# Patient Record
Sex: FEMALE | Race: WHITE | NOT HISPANIC OR LATINO | Employment: FULL TIME | URBAN - METROPOLITAN AREA
[De-identification: names, ages, dates, MRNs, and addresses within clinical notes are randomized per-mention and may not be internally consistent; named-entity substitution may affect disease eponyms.]

---

## 2023-08-09 LAB
ANION GAP IN SER/PLAS: 12 MMOL/L (ref 10–20)
CALCIUM (MG/DL) IN SER/PLAS: 9.1 MG/DL (ref 8.6–10.3)
CARBON DIOXIDE, TOTAL (MMOL/L) IN SER/PLAS: 28 MMOL/L (ref 21–32)
CHLORIDE (MMOL/L) IN SER/PLAS: 105 MMOL/L (ref 98–107)
CHOLESTEROL (MG/DL) IN SER/PLAS: 171 MG/DL (ref 0–199)
CHOLESTEROL IN HDL (MG/DL) IN SER/PLAS: 30.9 MG/DL
CHOLESTEROL/HDL RATIO: 5.5
CREATININE (MG/DL) IN SER/PLAS: 0.7 MG/DL (ref 0.5–1.05)
ESTIMATED AVERAGE GLUCOSE FOR HBA1C: 134 MG/DL
FASTING GLUCOSE (MG/DL) IN SER/PLAS: 119 MG/DL (ref 74–99)
GFR FEMALE: >90 ML/MIN/1.73M2
GLUCOSE (MG/DL) IN SER/PLAS: 120 MG/DL (ref 74–99)
HEMOGLOBIN A1C/HEMOGLOBIN TOTAL IN BLOOD: 6.3 %
LDL: 88 MG/DL (ref 0–99)
NON HDL CHOLESTEROL: 140 MG/DL
POTASSIUM (MMOL/L) IN SER/PLAS: 4 MMOL/L (ref 3.5–5.3)
SODIUM (MMOL/L) IN SER/PLAS: 141 MMOL/L (ref 136–145)
THYROTROPIN (MIU/L) IN SER/PLAS BY DETECTION LIMIT <= 0.05 MIU/L: 0.92 MIU/L (ref 0.44–3.98)
TRIGLYCERIDE (MG/DL) IN SER/PLAS: 261 MG/DL (ref 0–149)
UREA NITROGEN (MG/DL) IN SER/PLAS: 14 MG/DL (ref 6–23)
VLDL: 52 MG/DL (ref 0–40)

## 2023-11-09 ENCOUNTER — OFFICE VISIT (OUTPATIENT)
Dept: ORTHOPEDIC SURGERY | Facility: CLINIC | Age: 53
End: 2023-11-09
Payer: COMMERCIAL

## 2023-11-09 VITALS — HEIGHT: 63 IN | BODY MASS INDEX: 33.66 KG/M2 | WEIGHT: 190 LBS

## 2023-11-09 DIAGNOSIS — M54.31 SCIATIC PAIN, RIGHT: Primary | ICD-10-CM

## 2023-11-09 PROCEDURE — 99202 OFFICE O/P NEW SF 15 MIN: CPT

## 2023-11-09 PROCEDURE — 1036F TOBACCO NON-USER: CPT

## 2023-11-09 PROCEDURE — 3008F BODY MASS INDEX DOCD: CPT

## 2023-11-09 RX ORDER — ATORVASTATIN CALCIUM 10 MG/1
TABLET, FILM COATED ORAL
COMMUNITY

## 2023-11-09 RX ORDER — METHYLPREDNISOLONE 4 MG/1
4 TABLET ORAL ONCE
Qty: 21 TABLET | Refills: 0 | Status: SHIPPED | OUTPATIENT
Start: 2023-11-09 | End: 2023-11-09

## 2023-11-09 RX ORDER — ENALAPRIL MALEATE 10 MG/1
TABLET ORAL
COMMUNITY
Start: 2019-06-05

## 2023-11-10 NOTE — PROGRESS NOTES
Subjective    Patient ID: Camille Mchugh is a 53 y.o. female.    Chief Complaint: Other (SCIATICA PAIN)     HPI  This is a pleasant 53-year-old female presenting to the walk-in injury clinic for evaluation of right-sided sciatic pain, which has been ongoing for 4 days.  She has had right-sided sciatica in the past.  States she points to right sided lower back and buttock when describing the pain, which radiates down right thigh to the knee.  She is having difficulty sitting and standing due to the pain.  It is difficult for her to sleep at night.  Any movement of the lumbar spine aggravates pain.  Pain is described as a constant dull ache, sharp shooting at times.  She has been taking Tylenol with no relief of symptoms.  She cannot take anti-inflammatories as they cause GI upset.  She is a .    The patient's past medical, surgical, family, and social history as well as allergies and medications were reviewed and updated in the chart.    Objective   Ortho Exam  Pleasant and no acute distress. Walks with a mildly antalgic gait. There is decreased flexibility of the lumbosacral spine in terms of extension, flexion, left and right lateral flexion, left and right rotation.  There is tenderness on the lumbar paraspinal muscles, specific to right side.  Bilateral lower extremity quadriceps, hamstring, gastrocsoleus, tibialis anterior strength are intact. Sensation to light touch is intact.  Both lower extremities are well perfused the skin is intact and muscle tone is adequate.    Assessment/Plan   Encounter Diagnoses:  Sciatic pain, right    Plan: Discussion with patient about right sided sciatica pain.  I have provided her with a Medrol Dosepak to help decrease pain and inflammation.  She will also benefit from a formal physical therapy program to help with lower back strengthening and range of motion, referral provided.  As this seems to be an ongoing intermittent problem, I have also provided her a  referral to pain management, as she may benefit from steroid injections possibly.  She can continue to take Tylenol, use topical creams and ice application.  She can follow-up as needed.    Orders Placed This Encounter    Referral to Physical Therapy    Referral to Pain Medicine    methylPREDNISolone (Medrol Dospak) 4 mg tablets

## 2024-07-02 ENCOUNTER — APPOINTMENT (OUTPATIENT)
Dept: OTOLARYNGOLOGY | Facility: CLINIC | Age: 54
End: 2024-07-02
Payer: COMMERCIAL

## 2024-09-07 ENCOUNTER — LAB (OUTPATIENT)
Dept: LAB | Facility: LAB | Age: 54
End: 2024-09-07
Payer: COMMERCIAL

## 2024-09-07 DIAGNOSIS — Z13.1 ENCOUNTER FOR SCREENING FOR DIABETES MELLITUS: Primary | ICD-10-CM

## 2024-09-07 DIAGNOSIS — I10 ESSENTIAL (PRIMARY) HYPERTENSION: ICD-10-CM

## 2024-09-07 DIAGNOSIS — Z13.220 ENCOUNTER FOR SCREENING FOR LIPOID DISORDERS: ICD-10-CM

## 2024-09-07 DIAGNOSIS — E78.2 MIXED HYPERLIPIDEMIA: ICD-10-CM

## 2024-09-07 DIAGNOSIS — E04.9 NONTOXIC GOITER, UNSPECIFIED: ICD-10-CM

## 2024-09-07 LAB
ANION GAP SERPL CALC-SCNC: 13 MMOL/L (ref 10–20)
BUN SERPL-MCNC: 14 MG/DL (ref 6–23)
CALCIUM SERPL-MCNC: 9.5 MG/DL (ref 8.6–10.3)
CHLORIDE SERPL-SCNC: 104 MMOL/L (ref 98–107)
CHOLEST SERPL-MCNC: 165 MG/DL (ref 0–199)
CHOLESTEROL/HDL RATIO: 4.6
CO2 SERPL-SCNC: 30 MMOL/L (ref 21–32)
CREAT SERPL-MCNC: 0.67 MG/DL (ref 0.5–1.05)
EGFRCR SERPLBLD CKD-EPI 2021: >90 ML/MIN/1.73M*2
GLUCOSE SERPL-MCNC: 113 MG/DL (ref 74–99)
HDLC SERPL-MCNC: 36.1 MG/DL
LDLC SERPL CALC-MCNC: 84 MG/DL
NON HDL CHOLESTEROL: 129 MG/DL (ref 0–149)
POTASSIUM SERPL-SCNC: 4.5 MMOL/L (ref 3.5–5.3)
SODIUM SERPL-SCNC: 142 MMOL/L (ref 136–145)
TRIGL SERPL-MCNC: 225 MG/DL (ref 0–149)
TSH SERPL-ACNC: 0.6 MIU/L (ref 0.44–3.98)
VLDL: 45 MG/DL (ref 0–40)

## 2024-09-07 PROCEDURE — 36415 COLL VENOUS BLD VENIPUNCTURE: CPT

## 2024-10-23 ENCOUNTER — HOSPITAL ENCOUNTER (OUTPATIENT)
Dept: RADIOLOGY | Facility: HOSPITAL | Age: 54
Discharge: HOME | End: 2024-10-23

## 2024-10-23 DIAGNOSIS — Z13.6 ENCOUNTER FOR SCREENING FOR CARDIOVASCULAR DISORDERS: ICD-10-CM

## 2024-10-23 PROCEDURE — 75571 CT HRT W/O DYE W/CA TEST: CPT | Mod: LIO

## 2024-11-20 ENCOUNTER — OFFICE VISIT (OUTPATIENT)
Dept: OTOLARYNGOLOGY | Facility: CLINIC | Age: 54
End: 2024-11-20
Payer: COMMERCIAL

## 2024-11-20 VITALS
TEMPERATURE: 98.2 F | SYSTOLIC BLOOD PRESSURE: 128 MMHG | RESPIRATION RATE: 16 BRPM | DIASTOLIC BLOOD PRESSURE: 68 MMHG | BODY MASS INDEX: 34.11 KG/M2 | HEART RATE: 73 BPM | HEIGHT: 62 IN | OXYGEN SATURATION: 94 % | WEIGHT: 185.38 LBS

## 2024-11-20 DIAGNOSIS — J32.9 CHRONIC SINUSITIS, UNSPECIFIED LOCATION: ICD-10-CM

## 2024-11-20 DIAGNOSIS — R09.81 NASAL CONGESTION: ICD-10-CM

## 2024-11-20 DIAGNOSIS — J34.2 NASAL SEPTAL DEVIATION: ICD-10-CM

## 2024-11-20 DIAGNOSIS — J31.0 CHRONIC RHINITIS: Primary | ICD-10-CM

## 2024-11-20 DIAGNOSIS — L29.9 EAR ITCHING: ICD-10-CM

## 2024-11-20 DIAGNOSIS — H61.22 IMPACTED CERUMEN OF LEFT EAR: ICD-10-CM

## 2024-11-20 DIAGNOSIS — J34.3 HYPERTROPHY OF BOTH INFERIOR NASAL TURBINATES: ICD-10-CM

## 2024-11-20 PROCEDURE — 99213 OFFICE O/P EST LOW 20 MIN: CPT | Performed by: STUDENT IN AN ORGANIZED HEALTH CARE EDUCATION/TRAINING PROGRAM

## 2024-11-20 PROCEDURE — 1036F TOBACCO NON-USER: CPT | Performed by: STUDENT IN AN ORGANIZED HEALTH CARE EDUCATION/TRAINING PROGRAM

## 2024-11-20 PROCEDURE — 3008F BODY MASS INDEX DOCD: CPT | Performed by: STUDENT IN AN ORGANIZED HEALTH CARE EDUCATION/TRAINING PROGRAM

## 2024-11-20 PROCEDURE — 99203 OFFICE O/P NEW LOW 30 MIN: CPT | Performed by: STUDENT IN AN ORGANIZED HEALTH CARE EDUCATION/TRAINING PROGRAM

## 2024-11-20 RX ORDER — FLUTICASONE PROPIONATE 50 MCG
2 SPRAY, SUSPENSION (ML) NASAL DAILY
Qty: 16 G | Refills: 11 | Status: SHIPPED | OUTPATIENT
Start: 2024-11-20 | End: 2025-11-20

## 2024-11-20 SDOH — ECONOMIC STABILITY: FOOD INSECURITY: WITHIN THE PAST 12 MONTHS, YOU WORRIED THAT YOUR FOOD WOULD RUN OUT BEFORE YOU GOT MONEY TO BUY MORE.: NEVER TRUE

## 2024-11-20 SDOH — ECONOMIC STABILITY: FOOD INSECURITY: WITHIN THE PAST 12 MONTHS, THE FOOD YOU BOUGHT JUST DIDN'T LAST AND YOU DIDN'T HAVE MONEY TO GET MORE.: NEVER TRUE

## 2024-11-20 ASSESSMENT — LIFESTYLE VARIABLES
SKIP TO QUESTIONS 9-10: 1
HOW OFTEN DO YOU HAVE A DRINK CONTAINING ALCOHOL: NEVER
HOW OFTEN DO YOU HAVE SIX OR MORE DRINKS ON ONE OCCASION: NEVER
AUDIT-C TOTAL SCORE: 0
HOW MANY STANDARD DRINKS CONTAINING ALCOHOL DO YOU HAVE ON A TYPICAL DAY: PATIENT DOES NOT DRINK

## 2024-11-20 ASSESSMENT — ENCOUNTER SYMPTOMS
LOSS OF SENSATION IN FEET: 0
OCCASIONAL FEELINGS OF UNSTEADINESS: 0
DEPRESSION: 0

## 2024-11-20 ASSESSMENT — COLUMBIA-SUICIDE SEVERITY RATING SCALE - C-SSRS
1. IN THE PAST MONTH, HAVE YOU WISHED YOU WERE DEAD OR WISHED YOU COULD GO TO SLEEP AND NOT WAKE UP?: NO
6. HAVE YOU EVER DONE ANYTHING, STARTED TO DO ANYTHING, OR PREPARED TO DO ANYTHING TO END YOUR LIFE?: NO
2. HAVE YOU ACTUALLY HAD ANY THOUGHTS OF KILLING YOURSELF?: NO

## 2024-11-20 ASSESSMENT — PATIENT HEALTH QUESTIONNAIRE - PHQ9
SUM OF ALL RESPONSES TO PHQ9 QUESTIONS 1 AND 2: 0
2. FEELING DOWN, DEPRESSED OR HOPELESS: NOT AT ALL
1. LITTLE INTEREST OR PLEASURE IN DOING THINGS: NOT AT ALL

## 2024-11-20 ASSESSMENT — PAIN SCALES - GENERAL: PAINLEVEL_OUTOF10: 3

## 2024-11-20 NOTE — PATIENT INSTRUCTIONS
For ear itching you can try using 1-2 drops of baby oil or sweet oil in each ear 1-2 times a week.  Put the oil in your ear and lie on your side for 5 to 10 minutes, then switch sides. Alternatively you can use a skin moisturizer or over-the-counter steroid cream in the ear canal.    Avoid using Q-tips in your ear because these can actually dry your ear out further and cause more itching.    -------------------------------------------    Please use the combination of nasal steroid spray and saline irrigations for the next several months. Then see me again in 2-3 months to check in.    NASAL STEROID SPRAY INSTRUCTIONS    Please take the prescribed nasal spray as directed. BE SURE TO POINT THE SPRAY TOWARDS THE CORNER OF THE EYE ON THE SAME SIDE NOSTRIL. This will ensure you are treating the appropriate parts of your nose that are swollen or inflamed.    This medication will take upwards of one month before you notice full benefit. You MUST use it every day for it to be effective.

## 2024-11-20 NOTE — PROGRESS NOTES
SUBJECTIVE  Patient ID: Camille Mchugh is a 54 y.o. female who presents for New Patient Visit (Ear and sinus issues).    She reports that starting a year ago she noted new onset fullness on the left maxillary sinus. She notes itching and wetness of the ear. She reports 4 infections treated with antibiotics. They endorse nasal congestion and nasal obstruction. They deny epistaxis. Sense of smell seems down.  They endorse a history of environmental allergies; no formal allergy testing. No asthma but does have eczema. They deny a history of nasal/sinus surgery/trauma. Previously tried steroid spray (Flonase intermittently), antihistamine spray (slightly helpful), and decongestant spray (using Afrin intermittently).     Reportedly had audiogram last year which was normal.  She reports that she was seen by an outside ENT nurse practitioner who performed nasal endoscopy and did not note anything of significance.    Review of Systems  Complete ROS negative except as noted above or on patient intake form and as above.    OBJECTIVE  Physical Exam  CONSTITUTIONAL: Well appearing female who appears stated age.  PSYCHIATRIC: Alert, appropriate mood and affect.  RESPIRATORY: Normal inspiration and expiration and chest wall expansion; no use of accessory muscles to breathe.  VOICE: Clear speech without hoarseness. No stridor nor stertor.  HEAD, FACE, AND SKIN: Symmetric facial feature. No cutaneous masses or lesions were visualized. The parotid and submandibular glands were normal to palpation.  EYES: Pupils were equal in size and reactive to light. Extra-ocular muscle function was intact. No nystagmus was observed. Vision was grossly intact.  EARS: External ears were normally formed with no lesions. The external auditory canals were clear. Left cleaned of non-obstructive cerumen. The tympanic membranes were intact and in the neutral position. No significant retraction pockets nor effusions were appreciated.  NOSE: Nasal dorsum was  midline. Anterior rhinoscopy demonstrated a septum deviated to the left. Inferior turbinates were moderately hypertrophied. No obvious nasal masses, polyps, mucopurulence, nor other lesions were appreciated.  ORAL CAVITY: Lips were without lesions. Moist mucous membranes. No lesions appreciated along the gingiva, oral mucosa, nor tongue.  DENTITION: Grossly normal without obvious infection nor inflammation.  OROPHARYNX: No lesion nor mucosal abnormality. The uvula was normal appearing. The tonsils were 1+. Prominent gag.  NECK: Visualization and palpation of the neck revealed no mass lesions, no thyromegaly or thyroid masses. No cutaneous lesions appreciated.  LYMPHATICS (CERVICAL): There were no palpable lymph nodes in the posterior triangle, submandibular triangle, jugulodigastric region, nor central neck.  NEUROLOGIC: Cranial nerves III, IV, and VI were noted to be intact via extra-ocular muscle movement testing. Cranial nerve V was noted to be intact to soft touch bilaterally. Cranial nerve VII was noted to be intact and symmetric by facial movement. Cranial nerve VIII was tested with normal voice examination and revealed grossly normal hearing. Cranial nerves IX and X noted to be intact by palatal movement. Cranial nerve XI noted to be intact via shoulder shrug.  Cranial nerve XII noted to be intact with active and symmetric tongue movement.     ASSESSMENT/PLAN  Diagnoses and all orders for this visit:  Chronic rhinitis  -     fluticasone (Flonase) 50 mcg/actuation nasal spray; Administer 2 sprays into each nostril once daily. Shake gently. Before first use, prime pump. After use, clean tip and replace cap.  -     CT sinuss wo IV contrast volumetric surgical planning; Future  Chronic sinusitis, unspecified location  -     CT sinuss wo IV contrast volumetric surgical planning; Future  Ear itching  Nasal septal deviation  Hypertrophy of both inferior nasal turbinates  Nasal congestion  Impacted cerumen of left  ear      54 y.o. female presented today with several concerns.    1.  Chronic rhinitis, concern for chronic sinusitis, recurrent acute sinusitis, left facial pressure/congestion, NSD/ITH  The patient is noting roughly a year of recurrent acute sinusitis and persistent left-sided nasal congestion and facial pressure.  On exam they have left nasal septal deviation without obvious mucopurulence draining from the middle meatus.    Endoscopy: Deferred by patient; reportedly had normal endoscopy over the summer at outside facility  Imaging: Ordered    I discussed my findings with the patient and offered reassurance and counseling.  We discussed her options moving forward including continued observation, changes to medical therapy, repeat nasal endoscopy, and/or imaging.  The patient is interested in dedicated sinus imaging.  I encouraged her to consider trialing fluticasone nasal spray on a more regular basis while awaiting repeat evaluation.  We discussed that she can continue her azelastine spray with fluticasone nasal spray.    Follow-up in 2 to 3 months to check on her progress.  I will call her with the results of CT imaging if acute findings are appreciated.    2.  Ear itching, cerumen impaction, history of eczema  The patient also notes a history of recurrent ear itching in the setting of eczema.  On exam she had not impacted cerumen cleaned from the left ear canal.  The right ear canal is relatively clear.  I appreciate no evidence of active infection.  Reassurance was provided.  I recommended conservative therapy for chronic eczematoid otitis externa as needed.  We discussed use of skin moisturizing creams, oils, and/or topical steroid creams as needed.    This note was created using speech recognition transcription software. Despite proofreading, typographical errors may be present that affect the meaning of the content. Please contact my office with any questions.

## 2024-11-27 ENCOUNTER — APPOINTMENT (OUTPATIENT)
Dept: OTOLARYNGOLOGY | Facility: CLINIC | Age: 54
End: 2024-11-27
Payer: COMMERCIAL

## 2024-12-03 ENCOUNTER — HOSPITAL ENCOUNTER (OUTPATIENT)
Dept: RADIOLOGY | Facility: HOSPITAL | Age: 54
Discharge: HOME | End: 2024-12-03
Payer: COMMERCIAL

## 2024-12-03 DIAGNOSIS — J31.0 CHRONIC RHINITIS: ICD-10-CM

## 2024-12-03 DIAGNOSIS — J32.9 CHRONIC SINUSITIS, UNSPECIFIED LOCATION: ICD-10-CM

## 2024-12-03 PROCEDURE — 70486 CT MAXILLOFACIAL W/O DYE: CPT

## 2025-02-05 ENCOUNTER — APPOINTMENT (OUTPATIENT)
Dept: OTOLARYNGOLOGY | Facility: CLINIC | Age: 55
End: 2025-02-05
Payer: COMMERCIAL

## 2025-03-18 ENCOUNTER — APPOINTMENT (OUTPATIENT)
Dept: PRIMARY CARE | Facility: CLINIC | Age: 55
End: 2025-03-18
Payer: COMMERCIAL

## 2025-03-24 PROBLEM — I10 ESSENTIAL (PRIMARY) HYPERTENSION: Status: ACTIVE | Noted: 2023-02-09

## 2025-03-25 ENCOUNTER — APPOINTMENT (OUTPATIENT)
Dept: PRIMARY CARE | Facility: CLINIC | Age: 55
End: 2025-03-25
Payer: COMMERCIAL

## 2025-03-25 VITALS
WEIGHT: 191 LBS | DIASTOLIC BLOOD PRESSURE: 78 MMHG | OXYGEN SATURATION: 94 % | HEART RATE: 71 BPM | HEIGHT: 62 IN | BODY MASS INDEX: 35.15 KG/M2 | SYSTOLIC BLOOD PRESSURE: 130 MMHG | TEMPERATURE: 97.3 F

## 2025-03-25 DIAGNOSIS — E04.9 GOITER: ICD-10-CM

## 2025-03-25 DIAGNOSIS — R73.03 PREDIABETES: Primary | ICD-10-CM

## 2025-03-25 DIAGNOSIS — I10 ESSENTIAL (PRIMARY) HYPERTENSION: ICD-10-CM

## 2025-03-25 DIAGNOSIS — E78.2 MIXED HYPERLIPIDEMIA: ICD-10-CM

## 2025-03-25 LAB — POC HEMOGLOBIN A1C: 6.4 % (ref 4.2–6.5)

## 2025-03-25 PROCEDURE — 83036 HEMOGLOBIN GLYCOSYLATED A1C: CPT | Performed by: INTERNAL MEDICINE

## 2025-03-25 PROCEDURE — 1036F TOBACCO NON-USER: CPT | Performed by: INTERNAL MEDICINE

## 2025-03-25 PROCEDURE — 99203 OFFICE O/P NEW LOW 30 MIN: CPT | Performed by: INTERNAL MEDICINE

## 2025-03-25 PROCEDURE — 3008F BODY MASS INDEX DOCD: CPT | Performed by: INTERNAL MEDICINE

## 2025-03-25 PROCEDURE — 3075F SYST BP GE 130 - 139MM HG: CPT | Performed by: INTERNAL MEDICINE

## 2025-03-25 PROCEDURE — 3078F DIAST BP <80 MM HG: CPT | Performed by: INTERNAL MEDICINE

## 2025-03-25 RX ORDER — ATORVASTATIN CALCIUM 20 MG/1
20 TABLET, FILM COATED ORAL DAILY
COMMUNITY

## 2025-03-25 RX ORDER — POLYETHYLENE GLYCOL 3350 17 G/17G
17 POWDER, FOR SOLUTION ORAL AS NEEDED
COMMUNITY

## 2025-03-25 RX ORDER — AZELASTINE 1 MG/ML
1 SPRAY, METERED NASAL 2 TIMES DAILY
COMMUNITY

## 2025-03-25 ASSESSMENT — PATIENT HEALTH QUESTIONNAIRE - PHQ9
2. FEELING DOWN, DEPRESSED OR HOPELESS: NOT AT ALL
1. LITTLE INTEREST OR PLEASURE IN DOING THINGS: NOT AT ALL
SUM OF ALL RESPONSES TO PHQ9 QUESTIONS 1 AND 2: 0

## 2025-03-25 ASSESSMENT — ENCOUNTER SYMPTOMS
ABDOMINAL PAIN: 0
FREQUENCY: 0
NERVOUS/ANXIOUS: 0
JOINT SWELLING: 0
EYE PAIN: 0
UNEXPECTED WEIGHT CHANGE: 0
WHEEZING: 0
CHILLS: 0
EYE DISCHARGE: 0
SEIZURES: 0
WEAKNESS: 0
APPETITE CHANGE: 0
WOUND: 0
BACK PAIN: 0
CONSTIPATION: 1
FLANK PAIN: 0
DIZZINESS: 0
NAUSEA: 0
TREMORS: 0
NUMBNESS: 0
VOMITING: 0
TROUBLE SWALLOWING: 0
DYSURIA: 0
BLOOD IN STOOL: 0
DIARRHEA: 0
HEADACHES: 0
SHORTNESS OF BREATH: 0
CONFUSION: 0
HEMATURIA: 0
COUGH: 0
SORE THROAT: 0
PALPITATIONS: 0
SLEEP DISTURBANCE: 0
FEVER: 0

## 2025-03-25 ASSESSMENT — PAIN SCALES - GENERAL: PAINLEVEL_OUTOF10: 0-NO PAIN

## 2025-03-25 NOTE — PROGRESS NOTES
"Subjective   Patient ID: Camille Mchugh is a 54 y.o. female who presents for New Pt est care.    HPI   NEW PT (PCP Johnny Copeland)  Here to establish .  Chart reviewed, PMHX, meds, Social HX- updated at visit   Labs( sept 2024: Tg 225, Ok bmp and TSH)  and imaging reviewed.  A1c 6.3 in 2023 with no f/u since !    Thyroid US 2019:   IMPRESSION:  Benign subcentimeter colloid cysts in a nonenlarged thyroid gland.    Recent recent ER visits or hospitalizations:  No    Specialists:  Ob-gyn - Yes  [facility]- recurrent sinus infection  Podiatry     Review of Systems   Constitutional:  Negative for appetite change, chills, fever and unexpected weight change.   HENT:  Negative for congestion, ear pain, sneezing, sore throat and trouble swallowing.    Eyes:  Negative for pain, discharge and visual disturbance.   Respiratory:  Negative for cough, shortness of breath and wheezing.    Cardiovascular:  Negative for chest pain, palpitations and leg swelling.   Gastrointestinal:  Positive for constipation. Negative for abdominal pain, blood in stool, diarrhea, nausea and vomiting.   Genitourinary:  Negative for dysuria, flank pain, frequency, hematuria and urgency.   Musculoskeletal:  Negative for back pain, gait problem and joint swelling.   Skin:  Negative for rash and wound.   Neurological:  Negative for dizziness, tremors, seizures, syncope, weakness, numbness and headaches.   Psychiatric/Behavioral:  Negative for confusion, sleep disturbance and suicidal ideas. The patient is not nervous/anxious.        Objective   /78   Pulse 71   Temp 36.3 °C (97.3 °F)   Ht 1.575 m (5' 2\")   Wt 86.6 kg (191 lb)   SpO2 94%   BMI 34.93 kg/m²   Patient Health Questionnaire-2 Score: 0      Physical Exam  Vitals and nursing note reviewed.   Constitutional:       General: She is not in acute distress.     Appearance: Normal appearance.   HENT:      Head: Normocephalic and atraumatic.      Nose: Nose normal.      Mouth/Throat:      " "Mouth: Mucous membranes are moist.      Pharynx: Oropharynx is clear.   Eyes:      Extraocular Movements: Extraocular movements intact.      Conjunctiva/sclera: Conjunctivae normal.      Pupils: Pupils are equal, round, and reactive to light.   Neck:      Thyroid: Thyromegaly (chronic cysts) present.   Cardiovascular:      Rate and Rhythm: Normal rate and regular rhythm.      Heart sounds: No murmur heard.  Pulmonary:      Effort: Pulmonary effort is normal. No respiratory distress.      Breath sounds: Normal breath sounds. No wheezing or rhonchi.   Abdominal:      General: Abdomen is protuberant. Bowel sounds are normal.      Palpations: Abdomen is soft.      Tenderness: There is no abdominal tenderness.   Musculoskeletal:         General: Normal range of motion.      Cervical back: Neck supple.      Right lower leg: No edema.      Left lower leg: No edema.   Skin:     General: Skin is warm and dry.      Findings: No bruising or rash.   Neurological:      General: No focal deficit present.      Mental Status: She is alert and oriented to person, place, and time.      Motor: No weakness.      Gait: Gait normal.   Psychiatric:         Mood and Affect: Mood normal.         Behavior: Behavior normal.       Assessment/Plan   Assessment & Plan  Prediabetes  - chronic,  A1c has not been checked since 2023   POC : A1c 6.4   - pt interested in weight loss medicines - and agrees with clinical pharmacy referral   Orders:    POCT glycosylated hemoglobin (Hb A1C) manually resulted    Referral to Clinical Pharmacy; Future    Essential (primary) hypertension  - chronic, BP controlled on ACEI  - denies h/o MI or stroke       Mixed hyperlipidemia  - chronic, TG >200  - on mod-high dose atorvastatin        Goiter  - pt had remote formal eval and has \"colloid cysts in a nonenlarged thyroid gland\"-results reviewed in epic         RTC 6 months well check/physical /labs      "

## 2025-03-28 ENCOUNTER — TELEMEDICINE (OUTPATIENT)
Dept: PHARMACY | Facility: HOSPITAL | Age: 55
End: 2025-03-28
Payer: COMMERCIAL

## 2025-03-28 DIAGNOSIS — E66.9 OBESITY (BMI 30-39.9): Chronic | ICD-10-CM

## 2025-03-28 DIAGNOSIS — R73.03 PREDIABETES: Primary | Chronic | ICD-10-CM

## 2025-03-28 DIAGNOSIS — I10 ESSENTIAL (PRIMARY) HYPERTENSION: Chronic | ICD-10-CM

## 2025-03-28 RX ORDER — PSEUDOEPHEDRINE HCL 30 MG
30 TABLET ORAL EVERY 6 HOURS PRN
COMMUNITY

## 2025-03-28 RX ORDER — CETIRIZINE HYDROCHLORIDE 5 MG/1
10 TABLET ORAL DAILY PRN
COMMUNITY

## 2025-03-28 NOTE — PATIENT INSTRUCTIONS
NAME: Camille Mchugh   DATE: 3/28/2025     Your Pharmacist Today: Malou Elaine, Frank  Your Primary Care Physician: Keyanna Levy MD   Your Referring Provider: Keyanna Levy MD    Thank you for your time today, Ms. Camille Mchugh. It was a pleasure managing your health together! Below is a summary of your treatment plan, other important information, and our pharmacy team's contact numbers:  If you need to schedule or re-schedule an appointment with us, please call our scheduling line at [phone], option 1.   To schedule non-pharmacy appointments please call [phone]  If you are out of medication refills or have a medical question, please contact me at my direct line, [phone]. Please allow for up to 48 hours for a return call. You can also contact me through [employer].    DIAGNOSIS:        TREATMENT PLAN     Medication Changes:      Patient Goals  Weight Loss  BMI <25  2.5% weight loss in one month   At least 10% weight loss in 6 months  Cholesterol  LDL-C <55  TG <150   Blood Pressure   /80     Patient Instructions/Plan  Get hba1c lab - checked all ordered labs should be $0 copay for patient  Increase dietary fiber    Follow-up Appointment: Follow-up with me in next week.  Malou Elaine, PharmD   Clinical Pharmacist Specialist, Primary Care   Phone: [phone]   Fax: [phone]   Email: [email or web address]      MOUNJARO EDUCATION     Mounjaro is the first and only approved treatment in a different class of medication for type 2 diabetes.  It works differently than other type 2 diabetes medications by directly activating GIP and GLP-1 pathways to help regulate blood sugar.  GIP=glucose-dependent insulinotropic polypeptide; GLP-1=glucagon-like peptide-1  How Mounjaro works  Mounjaro is an injectable prescription medicine that is used along with diet and exercise to improve blood sugar (glucose) in adults with type 2 diabetes mellitus.  Mounjaro helps your body both regulate blood  sugar and decrease how much food you eat  It may start working to lower blood sugar from the first dose.  The 2.5 mg starting dose is not meant for blood sugar control.  Mounjaro works in multiple ways. It helps:  The body release insulin when blood sugar is high  The body remove excess sugar from the blood  Stop the liver from making and releasing too much sugar  Reduce how much food is eaten  Slow down how quickly food leaves the stomach. This lessens over time  MOUNJARO is a single-dose prefilled pen.  MOUNJARO is used 1 time each week.  Inject under the skin (subcutaneously) only.  You or another person can inject into your stomach (abdomen) or thigh.  Change (rotate) your injection site with each weekly injection. Do not use the same site for each injection.  Another person can inject into the back of your upper arm.  You may give an injection of Mounjaro and insulin in the same body area (such as your stomach area), but not right next to each other.    If you take too much Mounjaro, call your healthcare provider or seek medical advice promptly.    Mounjaro may cause serious side effects, including:  Inflammation of the pancreas (pancreatitis). Stop using Mounjaro and call your healthcare provider right away if you have severe pain in your stomach area (abdomen) that will not go away, with or without vomiting. You may feel the pain from your abdomen to your back.  Low blood sugar (hypoglycemia). Your risk for getting low blood sugar may be higher if you use Mounjaro with another medicine that can cause low blood sugar, such as a sulfonylurea or insulin. Signs and symptoms of low blood sugar may include dizziness or light-headedness, sweating, confusion or drowsiness, headache, blurred vision, slurred speech, shakiness, fast heartbeat, anxiety, irritability, or mood changes, hunger, weakness and feeling jittery.  Serious allergic reactions. Stop using Mounjaro and get medical help right away if you have any  symptoms of a serious allergic reaction, including swelling of your face, lips, tongue or throat, problems breathing or swallowing, severe rash or itching, fainting or feeling dizzy, and very rapid heartbeat.  Kidney problems (kidney failure). In people who have kidney problems, diarrhea, nausea, and vomiting may cause a loss of fluids (dehydration), which may cause kidney problems to get worse. It is important for you to drink fluids to help reduce your chance of dehydration.  Severe stomach problems. Stomach problems, sometimes severe, have been reported in people who use Mounjaro. Tell your healthcare provider if you have stomach problems that are severe or will not go away.  Changes in vision. Tell your healthcare provider if you have changes in vision during treatment with Mounjaro.  Gallbladder problems. Gallbladder problems have happened in some people who use Mounjaro. Tell your healthcare provider right away if you get symptoms of gallbladder problems, which may include pain in your upper stomach (abdomen), fever, yellowing of skin or eyes (jaundice), and clotilde-colored stools.  Food or liquid getting into the lungs during surgery or other procedures that use anesthesia or deep sleepiness (deep sedation). Mounjaro may increase the chance of food getting into your lungs during surgery or other procedures. Tell all your healthcare providers that you are taking Mounjaro before you are scheduled to have surgery or other procedures.    Preparing to inject MOUNJARO  Remove the Pen from the refrigerator.  Leave the gray base cap on until you are ready to inject.  Check the Pen label to make sure you have the right medicine and dose, and that it has not .  Inspect the Pen to make sure that it is not damaged.  Make sure the medicine:  is not frozen  is not cloudy  is colorless to slightly yellow  does not have particles  Choose your injection site  You or another person can inject the medicine in your stomach  (abdomen) or thigh.  Make sure the Pen is locked.  Do not unlock the Pen until you place the clear base on your skin and are ready to inject.  Pull the gray base cap straight off and throw it away in your household trash.  Do not put the gray base cap back on - this could damage the needle.  Do not touch the needle  Place clear base on skin, then unlock  Place the clear base flat against your skin at the injection site.  Unlock by turning the lock ring.  Press and hold the purple injection button for up to 10 seconds.  Listen for:  First click = injection started  Second click = injection completed  You will know your injection is complete when the gray plunger is visible.    Disposing of your used Pen  Put your used Pen in an FDA-cleared sharps disposal container right away after use. Do not throw away (dispose of) Pens in your household trash.  If you do not have an FDA-cleared sharps disposal container, you may use a household container that is: made of a heavy-duty plastic, can be closed with a tight-fitting, puncture-resistant lid, without sharps being able to come out, upright and stable during use, leak-resistant, and properly labeled to warn of hazardous waste inside the container.  When your sharps disposal container is almost full, you will need to follow your community guidelines for the right way to dispose of your sharps disposal container. There may be state or local laws about how you should throw away used needles and syringes.   For more information about safe sharps disposal, and for specific information about sharps disposal in the state that you live in, go to the FDA's website at: http://www.fda.gov/safesharpsdisposal.  Do not recycle your used sharps disposal container.    Storage and handling  Store your Pen in the refrigerator between 36°F to 46°F (2°C to 8°C).  You may store your Pen at room temperature up to 86°F (30°C) for up to 21 days.  Do not freeze your Pen. If the Pen has been frozen,  throw the Pen away and use a new Pen.  Store your Pen in the original carton to protect your Pen from light.  The Pen has glass parts. Handle it carefully. If you drop the Pen on a hard surface, do not use it. Use a new Pen for your injection.  Keep your MOUNJARO Pen and all medicines out of the reach of children.    Commonly asked questions  What if I see air bubbles in my Pen?  Air bubbles are normal.  What if my Pen is not at room temperature?  It is not necessary to warm the Pen to room temperature.  What if I unlock the Pen and press the purple injection button before pulling off the gray base cap?  Do not remove the gray base cap.   Throw away the Pen and get a new Pen.  What if there is a drop of liquid on the tip of the needle when I remove the gray base cap?  A drop of liquid on the tip of the needle is normal. Do not touch the needle.  Do I need to hold the injection button down until the injection is complete?  This is not necessary, but it may help you keep the Pen steady against your skin.  I heard more than 2 clicks during my injection--2 loud clicks and 1 soft one. Did I get my complete injection?  Some people may hear a soft click right before the second loud click. That is the normal operation of the Pen. Do not remove the Pen from your skin until you hear the second loud click.  I am not sure if my Pen worked the right way.  Check to see if you have received your dose. Your dose was delivered the right way if the gray plunger is visible.   If you do not see the nuñez plunger, contact Erika at 3-136-Ajfgp-Rx ([phone]) for further instructions. Until then, store your Pen safely to avoid an accidental needle stick.       IMPORTANT INFORMATION     Please call 911 for medical emergencies.  Our pharmacy team is generally available from Monday-Friday, 8 am - 5 pm.  If you need to get in touch with me, you may either call me at my direct line or you can use [employer].  If you are unable to make your  appointment, kindly call your our pharmacy scheduling line at [phone] at least 48 hours in advance to cancel and reschedule.  There are no supporting services by the pharmacy team on weekends and holidays, or after 5 PM on weekdays.      RETAIL PHARMACY     [facility]    [address]  [place] [id number]  Phone: [phone]  Hours: M-F: 8 am - 6 pm;   Saturday: 8 am - 4 pm; Sunday 9 am - 1 pm

## 2025-03-28 NOTE — PROGRESS NOTES
Clinical Pharmacy Appointment    Patient ID: [id number] Camille Mchugh is a 54 y.o. female who presents for First appointment for weight loss/ pre-diabetes. PCP and Referring Provider: Keyanna Levy MD   Last visit with PCP: 3/25/25   Next visit with PCP: 9/25/25    Subjective     PMH:   Past Medical History:   Diagnosis Date    Personal history of other diseases of the circulatory system     History of hypertension    Sinusitis       Family Hx:  family history includes Eczema in an other family member; environmental allergies in an other family member.     Social Hx:   reports that she has never smoked. She has never used smokeless tobacco. She reports that she does not drink alcohol and does not use drugs.     HISTORY OF PRESENT ILLNESS    WEIGHT LOSS    BMI Readings from Last 3 Encounters:   03/25/25 34.93 kg/m²   11/20/24 33.91 kg/m²   04/06/24 34.76 kg/m²   CLASS II OBESITY  Starting weight: 191 lbs. (86.6 kg)  Current weight:  In-Office  3/25/25 - 191 lbs  Does have a scale at home, but does not weigh self at home    Weight Related Complications   Prediabetes, type 2 diabetes mellitus (T2D), dyslipidemia, hypertension, metabolic syndrome, cardiovascular disease, nonalcoholic fatty liver disease, osteoarthritis, depression   Obstructive sleep apnea, asthma and reactive airway disease, and gastroesophageal reflux disease    Potential Contributing Factors  Alcohol use: Average 0 drinks/week  Tobacco use: No  Other drug use: No  Medications that may cause weight gain: none  Mental health: No current concerns (seasonal depression)  Eating Disorders: Denies Hx of any eating disorder  Comorbidities: Comorbidities: depressed mood, high cholesterol, and hypertension controlled with oral meds  Constipation, excess gas    Lifestyle  Goal  No more potato chips  Decrease sweets  Eats minimal  Lost 5-6 lbs last year and plateau'ed  Has always struggled with weight loss  Diet: 3 meals/day  Does not over eat, or eat   Eats  small meals at work  BK: eggs, sausage   LN: lunch-able  DN: small plates of home cooked meals  Snacks: chips occasionally, nuts, mini candy car, apple, grapes  Eliminated most junk food  Drinks: lots of water  Eats out x1 a week  Carry out: long horn  Has worked with nutritionist/dietician? 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 No, exclusion   Eligible for copay cards/programs? Yes, if PA approved - antidiabetics only  Eligible for  PAP? Yes, reports income <400% FPL    Medication Estimated Cost Expected weight loss Patient Risks and Cautions Notes   Wegovy   (semaglutide) $499/mo with savings card. 10-20% None      Zepbound   (tirzepatide) $650/mo with savings card.  Vials available at lower costs. 10-20%     Qsymia (phentermine-topiramate) $98/month via  Qsymia Engage 8-10% None Controlled substance   Contrave (bupropion-naltrexone) $99/month   via [employer] 5-10% None    Adipex   (phentermine) ~$15/month 3-5% None Controlled substance,  Short-term use only   Ricky   (OTC Orlistat) ~$60/month 3-5%  Adverse effects likely outweigh benefit.       Medication Reconciliation:  OTCs? Added zyrtec and sudafed prn for seasonal allergies    Drug Problems  No relevant drug interactions were noted.    Medication System Management  Patient's preferred pharmacy:     [facility] [id number] [place] [facility] [place] [address] [address]  [facility] [place] [address]  Phone: [phone] Fax: [phone]     Adherence/Medication Access:   Denies missed  doses  Does not have coverage for weight loss  Mounjaro/Ozempic require PA    Allergies   Reviewed? Yes  Changes? No    Current Outpatient Medications on File Prior to Visit   Medication Sig Dispense Refill    atorvastatin (Lipitor) 20 mg tablet Take 1 tablet (20 mg) by mouth once daily.      azelastine (Astelin) 137 mcg (0.1 %) nasal spray Administer 1 spray into each nostril 2 times a day. Use in each nostril as directed      docusate sodium (COLACE ORAL) Take by mouth if needed.      enalapril (Vasotec) 10 mg tablet       polyethylene glycol (Glycolax, Miralax) 17 gram packet Take 17 g by mouth if needed.      cetirizine (ZyrTEC) 5 mg tablet Take 2 tablets (10 mg) by mouth once daily as needed for allergies.      pseudoephedrine (Sudafed) 30 mg tablet Take 1 tablet (30 mg) by mouth every 6 hours if needed for congestion.      [DISCONTINUED] atorvastatin (Lipitor) 10 mg tablet Take by mouth.      [DISCONTINUED] fluticasone (Flonase) 50 mcg/actuation nasal spray Administer 2 sprays into each nostril once daily. Shake gently. Before first use, prime pump. After use, clean tip and replace cap. (Patient not taking: Reported on 3/25/2025) 16 g 11     No current facility-administered medications on file prior to visit.       Lab Results   Component Value Date    CALCIUM 9.5 09/07/2024    CO2 30 09/07/2024     09/07/2024    CREATININE 0.67 09/07/2024    GLUCOSE 113 (H) 09/07/2024    K 4.5 09/07/2024     09/07/2024    BUN 14 09/07/2024    ANIONGAP 13 09/07/2024    EGFR >90 09/07/2024     Lab Results   Component Value Date    TRIG 225 (H) 09/07/2024    CHOL 165 09/07/2024    LDLCALC 84 09/07/2024    HDL 36.1 09/07/2024     Lab Results   Component Value Date    HGBA1C 6.4 03/25/2025       ASSESSMENT AND PLAN     Patient Goals   Stage 1 or stage 2 lose at least 5% of their body weight  AACE/ACE guidelines recommend a loss of at least 10% of body weight for many complication-specific targets  Because early weight  "loss helps predict sustained weight reduction, the AACE/ACE recommend targeting 2.5% weight loss within 1 month for all patients with overweight or obesity.   All patients with overweight or obesity achieve the realistic and meaningful goal of 5% to 10% weight loss within 6 months.    Assessment and Plan  Current regimen includes diet and mild exercise. Patient's current weight reported as 191 lbs. Has lost 0 lbs (0% of TBW) and has not started therapy plan. As insurance does not cover weight loss medications and patient is 0.1% away from a diabetes diagnoses, plan to get repeat labs.  Weight Loss Plan: Start Mounjaro 2.5 mg subcutaneously once weekly  The goals of therapy are not \"being met\" with the current medication regimen.    Medication Changes: none  CONTINUE: all current medications    Future Considerations:  Immunizations: ensure up to date   Mounjaro  Increase statin    Monitoring:  Patient states her PCP said that a repeat hba1c may be more accurate than the POCT performed at their last clinic visit     Education:   Diet   Exercise  Medication  Counseled patient on MOA, expectations, side effects, duration of therapy, contraindications, administration, and monitoring parameters  Answered all patient questions and concerns; provided [facility] phone number if issues/questions arise    Patient Instructions:  Get hba1c lab - checked all ordered labs should be $0 copay for patient  Increase dietary fiber    Clinical Pharmacist follow-up: next week, Telehealth visit    Continue all meds under the continuation of care with the referring provider and clinical pharmacy team.    Malou Elaine, Frank   Clinical Pharmacist Specialist, Primary Care   Phone: [phone]   Fax: [phone]   Email: [email or web address]    Verbal consent to manage patient's drug therapy was obtained from the patient. They were informed they may decline to participate or withdraw from participation in pharmacy services at any time  "

## 2025-04-01 LAB
25(OH)D3+25(OH)D2 SERPL-MCNC: 20 NG/ML (ref 30–100)
ALBUMIN SERPL-MCNC: 4.1 G/DL (ref 3.6–5.1)
ALP SERPL-CCNC: 98 U/L (ref 37–153)
ALT SERPL-CCNC: 25 U/L (ref 6–29)
ANION GAP SERPL CALCULATED.4IONS-SCNC: 7 MMOL/L (CALC) (ref 7–17)
AST SERPL-CCNC: 22 U/L (ref 10–35)
BASOPHILS # BLD AUTO: 41 CELLS/UL (ref 0–200)
BASOPHILS NFR BLD AUTO: 1 %
BILIRUB SERPL-MCNC: 0.4 MG/DL (ref 0.2–1.2)
BUN SERPL-MCNC: 14 MG/DL (ref 7–25)
CALCIUM SERPL-MCNC: 9.1 MG/DL (ref 8.6–10.4)
CHLORIDE SERPL-SCNC: 103 MMOL/L (ref 98–110)
CHOLEST SERPL-MCNC: 169 MG/DL
CHOLEST/HDLC SERPL: 5.1 (CALC)
CO2 SERPL-SCNC: 30 MMOL/L (ref 20–32)
CREAT SERPL-MCNC: 0.65 MG/DL (ref 0.5–1.03)
EGFRCR SERPLBLD CKD-EPI 2021: 104 ML/MIN/1.73M2
EOSINOPHIL # BLD AUTO: 119 CELLS/UL (ref 15–500)
EOSINOPHIL NFR BLD AUTO: 2.9 %
ERYTHROCYTE [DISTWIDTH] IN BLOOD BY AUTOMATED COUNT: 14 % (ref 11–15)
EST. AVERAGE GLUCOSE BLD GHB EST-MCNC: 140 MG/DL
EST. AVERAGE GLUCOSE BLD GHB EST-SCNC: 7.7 MMOL/L
GLUCOSE SERPL-MCNC: 144 MG/DL (ref 65–99)
HBA1C MFR BLD: 6.5 % OF TOTAL HGB
HCT VFR BLD AUTO: 40.6 % (ref 35–45)
HDLC SERPL-MCNC: 33 MG/DL
HGB BLD-MCNC: 13.1 G/DL (ref 11.7–15.5)
IRON SATN MFR SERPL: 18 % (CALC) (ref 16–45)
IRON SERPL-MCNC: 48 MCG/DL (ref 45–160)
LDLC SERPL CALC-MCNC: 86 MG/DL (CALC)
LYMPHOCYTES # BLD AUTO: 1456 CELLS/UL (ref 850–3900)
LYMPHOCYTES NFR BLD AUTO: 35.5 %
MAGNESIUM SERPL-MCNC: 1.9 MG/DL (ref 1.5–2.5)
MCH RBC QN AUTO: 27 PG (ref 27–33)
MCHC RBC AUTO-ENTMCNC: 32.3 G/DL (ref 32–36)
MCV RBC AUTO: 83.7 FL (ref 80–100)
MONOCYTES # BLD AUTO: 406 CELLS/UL (ref 200–950)
MONOCYTES NFR BLD AUTO: 9.9 %
NEUTROPHILS # BLD AUTO: 2079 CELLS/UL (ref 1500–7800)
NEUTROPHILS NFR BLD AUTO: 50.7 %
NONHDLC SERPL-MCNC: 136 MG/DL (CALC)
PHOSPHATE SERPL-MCNC: 4.6 MG/DL (ref 2.5–4.5)
PLATELET # BLD AUTO: 188 THOUSAND/UL (ref 140–400)
PMV BLD REES-ECKER: 9.9 FL (ref 7.5–12.5)
POTASSIUM SERPL-SCNC: 4.3 MMOL/L (ref 3.5–5.3)
PROT SERPL-MCNC: 6.7 G/DL (ref 6.1–8.1)
RBC # BLD AUTO: 4.85 MILLION/UL (ref 3.8–5.1)
SODIUM SERPL-SCNC: 140 MMOL/L (ref 135–146)
TIBC SERPL-MCNC: 272 MCG/DL (CALC) (ref 250–450)
TRIGL SERPL-MCNC: 377 MG/DL
VIT B12 SERPL-MCNC: 277 PG/ML (ref 200–1100)
WBC # BLD AUTO: 4.1 THOUSAND/UL (ref 3.8–10.8)

## 2025-04-03 ENCOUNTER — APPOINTMENT (OUTPATIENT)
Dept: PHARMACY | Facility: HOSPITAL | Age: 55
End: 2025-04-03
Payer: COMMERCIAL

## 2025-04-03 DIAGNOSIS — I10 ESSENTIAL (PRIMARY) HYPERTENSION: ICD-10-CM

## 2025-04-03 DIAGNOSIS — E78.5 HYPERLIPIDEMIA LDL GOAL <70: ICD-10-CM

## 2025-04-03 DIAGNOSIS — E11.9 TYPE 2 DIABETES MELLITUS WITHOUT COMPLICATION, WITH NO HISTORY OF INSULIN USE: Primary | ICD-10-CM

## 2025-04-03 DIAGNOSIS — E66.9 OBESITY (BMI 30-39.9): ICD-10-CM

## 2025-04-03 DIAGNOSIS — E78.1 HYPERTRIGLYCERIDEMIA: ICD-10-CM

## 2025-04-03 RX ORDER — TIRZEPATIDE 2.5 MG/.5ML
2.5 INJECTION, SOLUTION SUBCUTANEOUS WEEKLY
Qty: 2 ML | Refills: 0 | Status: SHIPPED | OUTPATIENT
Start: 2025-04-03

## 2025-04-03 RX ORDER — TIRZEPATIDE 2.5 MG/.5ML
2.5 INJECTION, SOLUTION SUBCUTANEOUS WEEKLY
Qty: 2 ML | Refills: 0 | Status: SHIPPED | OUTPATIENT
Start: 2025-04-03 | End: 2025-04-03 | Stop reason: SDUPTHER

## 2025-04-03 RX ORDER — ERGOCALCIFEROL 1.25 MG/1
50000 CAPSULE ORAL WEEKLY
Qty: 16 CAPSULE | Refills: 1 | Status: SHIPPED | OUTPATIENT
Start: 2025-04-03 | End: 2025-11-13

## 2025-04-03 RX ORDER — ATORVASTATIN CALCIUM 40 MG/1
40 TABLET, FILM COATED ORAL DAILY
Qty: 90 TABLET | Refills: 3 | Status: SHIPPED | OUTPATIENT
Start: 2025-04-03 | End: 2026-04-03

## 2025-04-03 RX ORDER — ATORVASTATIN CALCIUM 40 MG/1
40 TABLET, FILM COATED ORAL DAILY
Qty: 100 TABLET | Refills: 3 | Status: SHIPPED | OUTPATIENT
Start: 2025-04-03 | End: 2025-04-03

## 2025-04-03 NOTE — PATIENT INSTRUCTIONS
INCREASE Atorvastatin 40 mg once daily  START:   Vit D 50 k units once weekly   Mounjaro 2.5 mg under the skin once weekly, plan to increase dose according to standard titration plan pending patient tolerability and therapeutic response.   Mounjaro 5 mg under the skin once weekly x 4 weeks  Mounjaro 7.5 mg under the skin once weekly x 4 weeks  Mounjaro 10 mg under the skin once weekly x 4 weeks  Mounjaro 12.5 mg under the skin once weekly x 4 weeks  Mounjaro 15 mg under the skin once weekly as maintenance  * option to stop titration at any point and continue as maintenance dose *   Malou Elaine, PharmJESSICA   Clinical Pharmacist Specialist, Primary Care   Phone: [phone]   Fax: [phone]   Email: [email or web address]

## 2025-04-03 NOTE — PROGRESS NOTES
Clinical Pharmacy Appointment    Patient ID: [id number] Camille Mchugh is a 54 y.o. female who presents for Follow Up appointment for weight loss/ pre-diabetes. PCP and Referring Provider: Keyanna Levy MD   Last visit with PCP: 3/25/25   Next visit with PCP: 9/25/25    Subjective     PMH:   Past Medical History:   Diagnosis Date    Personal history of other diseases of the circulatory system     History of hypertension    Sinusitis       Family Hx:  family history includes Eczema in an other family member; environmental allergies in an other family member.     Social Hx:   reports that she has never smoked. She has never used smokeless tobacco. She reports that she does not drink alcohol and does not use drugs.     HISTORY OF PRESENT ILLNESS    WEIGHT LOSS    BMI Readings from Last 3 Encounters:   03/25/25 34.93 kg/m²   11/20/24 33.91 kg/m²   04/06/24 34.76 kg/m²   CLASS II OBESITY  Starting weight: 191 lbs. (86.6 kg)  Current weight:  In-Office  3/25/25 - 191 lbs  Does have a scale at home, but does not weigh self at home    Weight Related Complications   Prediabetes, type 2 diabetes mellitus (T2D), dyslipidemia, hypertension, metabolic syndrome, cardiovascular disease, nonalcoholic fatty liver disease, osteoarthritis, depression   Obstructive sleep apnea, asthma and reactive airway disease, and gastroesophageal reflux disease    Potential Contributing Factors  Alcohol use: Average 0 drinks/week  Tobacco use: No  Other drug use: No  Medications that may cause weight gain: none  Mental health: No current concerns (seasonal depression)  Eating Disorders: Denies Hx of any eating disorder  Comorbidities: Comorbidities: depressed mood, high cholesterol, and hypertension controlled with oral meds  Constipation, excess gas    Lifestyle  Goal  No more potato chips  Decrease sweets  Eats minimal  Lost 5-6 lbs last year and plateau'ed  Has always struggled with weight loss  Diet: 3 meals/day  Does not over eat, or eat large  portions  Eats small meals at work  BK: eggs, sausage   LN: lunch-able  DN: small plates of home cooked meals  Snacks: chips occasionally, nuts, mini candy car, apple, grapes  Eliminated most junk food  Drinks: lots of water  Eats out x1 a week  Carry out: long horn  Has worked with nutritionist/dietician? 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 No, exclusion   Eligible for copay cards/programs? Yes, if PA approved - antidiabetics only  Eligible for  PAP? Yes, reports income <400% FPL      Medication Reconciliation:  OTCs? Added zyrtec and sudafed prn for seasonal allergies    Drug Problems  No relevant drug interactions were noted.    Medication System Management  Patient's preferred pharmacy:     [facility] [id number] [place] [address] [address]  [facility] [place] [address]  Phone: [phone] Fax: [phone]     Adherence/Medication Access:   Denies missed doses  Does not have coverage for weight loss  Mounjaro/Ozempic require PA    Allergies   Reviewed? Yes  Changes? No    Current Outpatient Medications on File Prior to Visit   Medication Sig Dispense Refill    atorvastatin (Lipitor) 20 mg tablet Take 1 tablet (20 mg) by mouth once daily.      azelastine (Astelin) 137 mcg (0.1 %) nasal spray Administer 1 spray into each nostril 2 times a day. Use in each nostril as directed      cetirizine (ZyrTEC) 5 mg tablet Take 2 tablets (10 mg) by mouth once daily as needed for allergies.       docusate sodium (COLACE ORAL) Take by mouth if needed.      enalapril (Vasotec) 10 mg tablet       polyethylene glycol (Glycolax, Miralax) 17 gram packet Take 17 g by mouth if needed.      pseudoephedrine (Sudafed) 30 mg tablet Take 1 tablet (30 mg) by mouth every 6 hours if needed for congestion.      [DISCONTINUED] fluticasone (Flonase) 50 mcg/actuation nasal spray Administer 2 sprays into each nostril once daily. Shake gently. Before first use, prime pump. After use, clean tip and replace cap. (Patient not taking: Reported on 3/25/2025) 16 g 11     No current facility-administered medications on file prior to visit.     Lab Results   Component Value Date    BILITOT 0.4 2025    CALCIUM 9.1 2025    CO2 30 2025     2025    CREATININE 0.65 2025    GLUCOSE 144 (H) 2025    ALKPHOS 98 2025    K 4.3 2025    PROT 6.7 2025     2025    AST 22 2025    ALT 25 2025    BUN 14 2025    ANIONGAP 7 2025    MG 1.9 2025    PHOS 4.6 (H) 2025    ALBUMIN 4.1 2025    EGFR 104 2025     Lab Results   Component Value Date    TRIG 377 (H) 2025    CHOL 169 2025    LDLCALC 86 2025    HDL 33 (L) 2025     Lab Results   Component Value Date    HGBA1C 6.5 (H) 2025     ASSESSMENT AND PLAN     Patient Goals   Stage 1 or stage 2 lose at least 5% of their body weight  AACE/ACE guidelines recommend a loss of at least 10% of body weight for many complication-specific targets  Because early weight loss helps predict sustained weight reduction, the AACE/ACE recommend targeting 2.5% weight loss within 1 month for all patients with overweight or obesity.   All patients with overweight or obesity achieve the realistic and meaningful goal of 5% to 10% weight loss within 6 months.  Diabetes  Fasting B - 130 mg/dL  Postprandial BG: less than 180 mg/dL  A1c: less than 6.5% - not at goal  Weight Loss  BMI <25 -  "not at goal  2.5% weight loss in one month   At least 10% weight loss in 6 months  Cholesterol  LDL-C <70 - not at goal  TG <150 - not at goal  Blood Pressure   /80 - at goal      Assessment and Plan  Current regimen includes diet and mild exercise. Patient's current weight reported as 191 lbs. Has lost 0 lbs (0% of TBW) and has not started therapy plan. With patient's new diabetes diagnosis, I believe her insurance will approve this medication either at this time or after she has trialed on another antidiabetic. Patient is agreeable to this plan and has been researching mounjaro on her own and is happy to start this medication.   Patient described a generally healthy diet, with exception to cholesterol/TG. Patient to improve diet and continue to increase her exercise including 3-5 days of moderate intensity cardio and slowly increasing her daily step goal. Patient agreeable to this plan. Sending mounjaro to [employer] as it requires a pa. Plan to educate patient on mounjaro and check on pa status in one week and if approved will walk patient through her first injection. Patient was also worried about low vit d recommended, I am agreeable to give a once weekly prescription vit d for decreased financial and medication burden as it is $0 for the patient. Will repeat labs in 6-12 weeks. TG and LDL-c not at goal, will increase statin.  Weight Loss Plan: Start Mounjaro 2.5 mg subcutaneously once weekly  The goals of therapy are not \"being met\" with the current medication regimen.    Medication Changes:   INCREASE:   Atorvastatin 40 mg once daily  START:   Vit D 50 k units once weekly   Mounjaro 2.5 mg under the skin once weekly, plan to increase dose according to standard titration plan pending patient tolerability and therapeutic response.   Mounjaro 5 mg under the skin once weekly x 4 weeks  Mounjaro 7.5 mg under the skin once weekly x 4 weeks  Mounjaro 10 mg under the skin once weekly x 4 weeks  Mounjaro 12.5 mg " under the skin once weekly x 4 weeks  Mounjaro 15 mg under the skin once weekly as maintenance  * option to stop titration at any point and continue as maintenance dose *     Future Considerations:  Immunizations: ensure up to date   Mounjaro  Increase statin    Monitoring:  Patient states her PCP said that a repeat hba1c may be more accurate than the POCT performed at their last clinic visit     Education:   Diet   Exercise  Medication  Counseled patient on MOA, expectations, side effects, duration of therapy, contraindications, administration, and monitoring parameters  Answered all patient questions and concerns; provided [facility] phone number if issues/questions arise    Patient Instructions:  Get hba1c lab - checked all ordered labs should be $0 copay for patient  Increase dietary fiber    Clinical Pharmacist follow-up: next week, Telehealth visit    Continue all meds under the continuation of care with the referring provider and clinical pharmacy team.    Malou Elaine, Frank   Clinical Pharmacist Specialist, Primary Care   Phone: [phone]   Fax: [phone]   Email: [email or web address]    Verbal consent to manage patient's drug therapy was obtained from the patient. They were informed they may decline to participate or withdraw from participation in pharmacy services at any time

## 2025-04-04 ENCOUNTER — TELEPHONE (OUTPATIENT)
Dept: PHARMACY | Facility: HOSPITAL | Age: 55
End: 2025-04-04
Payer: COMMERCIAL

## 2025-04-04 NOTE — TELEPHONE ENCOUNTER
Discussed sending prescriptions to  pharmacy for assistance with PA team, discussed this with patient yesterday and she demonstrated understanding at this time.     Today when speaking with her, she said she was scared her insurance wouldn't cover her medication at any other pharmacy. Confirmed that it was covered per test claim with insurance. Patient had all 3 scripts transferred to [facility].     She said her Mounjaro was filled at [facility] and said if it was approved at  it should be approved at [facility]. Discussed that [facility] did send prior auth and [facility] did not. Patient to follow with [facility] at this time as she had transferred them yesterday after our appointment and I am unable to see the status and now I am not the provider that will be contacted, but Dr Levy will from now on.     Malou Elaine, PharmD   Clinical Pharmacist Specialist, Primary Care   Phone: [phone]   Fax: [phone]   Email: [email or web address]

## 2025-04-10 ENCOUNTER — APPOINTMENT (OUTPATIENT)
Dept: PHARMACY | Facility: HOSPITAL | Age: 55
End: 2025-04-10
Payer: COMMERCIAL

## 2025-04-10 ENCOUNTER — TELEMEDICINE (OUTPATIENT)
Dept: PHARMACY | Facility: HOSPITAL | Age: 55
End: 2025-04-10
Payer: COMMERCIAL

## 2025-04-10 DIAGNOSIS — E78.5 HYPERLIPIDEMIA LDL GOAL <70: ICD-10-CM

## 2025-04-10 DIAGNOSIS — E11.9 TYPE 2 DIABETES MELLITUS WITHOUT COMPLICATION, WITH NO HISTORY OF INSULIN USE: Primary | ICD-10-CM

## 2025-04-10 DIAGNOSIS — E66.9 OBESITY (BMI 30-39.9): ICD-10-CM

## 2025-04-10 DIAGNOSIS — E78.1 HYPERTRIGLYCERIDEMIA: ICD-10-CM

## 2025-04-10 DIAGNOSIS — I10 ESSENTIAL (PRIMARY) HYPERTENSION: ICD-10-CM

## 2025-04-10 PROCEDURE — RXMED WILLOW AMBULATORY MEDICATION CHARGE

## 2025-04-10 RX ORDER — TIRZEPATIDE 2.5 MG/.5ML
2.5 INJECTION, SOLUTION SUBCUTANEOUS WEEKLY
Qty: 2 ML | Refills: 0 | Status: SHIPPED | OUTPATIENT
Start: 2025-04-10

## 2025-04-10 NOTE — PROGRESS NOTES
Clinical Pharmacy Appointment    Patient ID: [id number] Camille Mchugh is a 54 y.o. female who presents for Follow Up appointment for weight loss/ pre-diabetes. PCP and Referring Provider: Keyanna Levy MD   Last visit with PCP: 3/25/25   Next visit with PCP: 9/25/25    Subjective     PMH:   Past Medical History:   Diagnosis Date    Personal history of other diseases of the circulatory system     History of hypertension    Sinusitis       Family Hx:  family history includes Eczema in an other family member; environmental allergies in an other family member.     Social Hx:   reports that she has never smoked. She has never used smokeless tobacco. She reports that she does not drink alcohol and does not use drugs.     HISTORY OF PRESENT ILLNESS    WEIGHT LOSS    BMI Readings from Last 3 Encounters:   03/25/25 34.93 kg/m²   11/20/24 33.91 kg/m²   04/06/24 34.76 kg/m²   CLASS II OBESITY  Starting weight: 191 lbs. (86.6 kg)  Current weight:  In-Office  3/25/25 - 191 lbs  Does have a scale at home, but does not weigh self at home    Weight Related Complications   Prediabetes, type 2 diabetes mellitus (T2D), dyslipidemia, hypertension, metabolic syndrome, cardiovascular disease, nonalcoholic fatty liver disease, osteoarthritis, depression   Obstructive sleep apnea, asthma and reactive airway disease, and gastroesophageal reflux disease    Potential Contributing Factors  Alcohol use: Average 0 drinks/week  Tobacco use: No  Other drug use: No  Medications that may cause weight gain: none  Mental health: No current concerns (seasonal depression)  Eating Disorders: Denies Hx of any eating disorder  Comorbidities: Comorbidities: depressed mood, high cholesterol, and hypertension controlled with oral meds  Constipation, excess gas    Lifestyle  Goal  No more potato chips  Decrease sweets  Eats minimal  Lost 5-6 lbs last year and plateau'ed  Has always struggled with weight loss  Diet: 3 meals/day  Does not over eat, or eat large  portions  Eats small meals at work  BK: eggs, sausage   LN: lunch-able  DN: small plates of home cooked meals  Snacks: chips occasionally, nuts, mini candy car, apple, grapes  Eliminated most junk food  Drinks: lots of water  Eats out x1 a week  Carry out: long horn  Has worked with nutritionist/dietician? 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 No, exclusion   Eligible for copay cards/programs? Yes, if PA approved - antidiabetics only  Eligible for  PAP? Yes, reports income <400% FPL      Medication Reconciliation:  OTCs? Added zyrtec and sudafed prn for seasonal allergies    Drug Problems  No relevant drug interactions were noted.    Medication System Management  Patient's preferred pharmacy:     [facility] [address] [place] [address] [address]  [facility] [address]  [facility] [place] [address]  Phone: [phone] Fax: [phone]    [facility]  [address], [address]  [facility] [address]  Phone: [phone] Fax: [phone]     Adherence/Medication Access:   Denies missed doses  Does not have coverage for weight loss  Mounjaro/Ozempic require PA    Allergies   Reviewed? Yes  Changes? No    Current Outpatient Medications on File Prior to Visit   Medication Sig Dispense Refill    atorvastatin (Lipitor) 40 mg tablet Take 1 tablet (40 mg) by mouth once daily. 90 tablet 3    azelastine (Astelin) 137 mcg (0.1 %) nasal spray Administer 1 spray into each nostril 2 times a day. Use in each  nostril as directed      cetirizine (ZyrTEC) 5 mg tablet Take 2 tablets (10 mg) by mouth once daily as needed for allergies.      docusate sodium (COLACE ORAL) Take by mouth if needed.      enalapril (Vasotec) 10 mg tablet       ergocalciferol (Vitamin D-2) 1250 mcg (50,000 units) capsule Take 1 capsule (50,000 Units) by mouth 1 (one) time per week. 16 capsule 1    polyethylene glycol (Glycolax, Miralax) 17 gram packet Take 17 g by mouth if needed.      pseudoephedrine (Sudafed) 30 mg tablet Take 1 tablet (30 mg) by mouth every 6 hours if needed for congestion.      tirzepatide (Mounjaro) 2.5 mg/0.5 mL pen injector Inject 2.5 mg under the skin 1 (one) time per week. 2 mL 0    [DISCONTINUED] tirzepatide (Mounjaro) 2.5 mg/0.5 mL pen injector Inject 2.5 mg under the skin 1 (one) time per week. 2 mL 0     No current facility-administered medications on file prior to visit.     Lab Results   Component Value Date    BILITOT 0.4 03/31/2025    CALCIUM 9.1 03/31/2025    CO2 30 03/31/2025     03/31/2025    CREATININE 0.65 03/31/2025    GLUCOSE 144 (H) 03/31/2025    ALKPHOS 98 03/31/2025    K 4.3 03/31/2025    PROT 6.7 03/31/2025     03/31/2025    AST 22 03/31/2025    ALT 25 03/31/2025    BUN 14 03/31/2025    ANIONGAP 7 03/31/2025    MG 1.9 03/31/2025    PHOS 4.6 (H) 03/31/2025    ALBUMIN 4.1 03/31/2025    EGFR 104 03/31/2025     Lab Results   Component Value Date    TRIG 377 (H) 03/31/2025    CHOL 169 03/31/2025    LDLCALC 86 03/31/2025    HDL 33 (L) 03/31/2025     Lab Results   Component Value Date    HGBA1C 6.5 (H) 03/31/2025     ASSESSMENT AND PLAN     Patient has not received medication and does not know why it isnt available to  at [facility], sending back to [facility]    Patient Goals   Stage 1 or stage 2 lose at least 5% of their body weight  AACE/ACE guidelines recommend a loss of at least 10% of body weight for many complication-specific targets  Because early weight loss helps predict sustained weight  "reduction, the AACE/ACE recommend targeting 2.5% weight loss within 1 month for all patients with overweight or obesity.   All patients with overweight or obesity achieve the realistic and meaningful goal of 5% to 10% weight loss within 6 months.  Diabetes  Fasting B - 130 mg/dL  Postprandial BG: less than 180 mg/dL  A1c: less than 6.5% - not at goal  Weight Loss  BMI <25 - not at goal  2.5% weight loss in one month   At least 10% weight loss in 6 months  Cholesterol  LDL-C <70 - not at goal  TG <150 - not at goal  Blood Pressure   /80 - at goal    Assessment and Plan  Current regimen includes diet and mild exercise. Patient's current weight reported as 191 lbs. Has lost 0 lbs (0% of TBW) and has not started therapy plan. With patient's new diabetes diagnosis, I believe her insurance will approve this medication either at this time or after she has trialed on another antidiabetic. Patient is agreeable to this plan and has been researching mounjaro on her own and is happy to start this medication.   Patient described a generally healthy diet, with exception to cholesterol/TG. Patient to improve diet and continue to increase her exercise including 3-5 days of moderate intensity cardio and slowly increasing her daily step goal. Patient agreeable to this plan. Sending mounjaro to [facility] as it requires a pa. Plan to educate patient on mounjaro and check on pa status in one week and if approved will walk patient through her first injection. Patient was also worried about low vit d recommended, I am agreeable to give a once weekly prescription vit d for decreased financial and medication burden as it is $0 for the patient. Will repeat labs in 6-12 weeks. TG and LDL-c not at goal, will increase statin.  Weight Loss Plan: Start Mounjaro 2.5 mg subcutaneously once weekly  The goals of therapy are not \"being met\" with the current medication regimen.    Medication Changes:   INCREASE:   Atorvastatin 40 mg once " daily  START:   Vit D 50 k units once weekly   Mounjaro 2.5 mg under the skin once weekly, plan to increase dose according to standard titration plan pending patient tolerability and therapeutic response.   Mounjaro 5 mg under the skin once weekly x 4 weeks  Mounjaro 7.5 mg under the skin once weekly x 4 weeks  Mounjaro 10 mg under the skin once weekly x 4 weeks  Mounjaro 12.5 mg under the skin once weekly x 4 weeks  Mounjaro 15 mg under the skin once weekly as maintenance  * option to stop titration at any point and continue as maintenance dose *     Future Considerations:  Immunizations: ensure up to date   Mounjaro  Increase statin    Monitoring:  Patient states her PCP said that a repeat hba1c may be more accurate than the POCT performed at their last clinic visit     Education:   Diet   Exercise  Medication  Counseled patient on MOA, expectations, side effects, duration of therapy, contraindications, administration, and monitoring parameters  Answered all patient questions and concerns; provided [facility] phone number if issues/questions arise    Patient Instructions:  Get hba1c lab - checked all ordered labs should be $0 copay for patient  Increase dietary fiber    Clinical Pharmacist follow-up: next week, Telehealth visit    Continue all meds under the continuation of care with the referring provider and clinical pharmacy team.    Malou Elaine PharmD   Clinical Pharmacist Specialist, Primary Care   Phone: [phone]   Fax: [phone]   Email: [email or web address]    Verbal consent to manage patient's drug therapy was obtained from the patient. They were informed they may decline to participate or withdraw from participation in pharmacy services at any time

## 2025-04-10 NOTE — PATIENT INSTRUCTIONS
Malou Elaine, Frank   Clinical Pharmacist Specialist, Primary Care   Phone: [phone]   Fax: [phone]   Email: [email or web address]      IMPORTANT INFORMATION     Please call 911 for medical emergencies.  Our pharmacy team is generally available from Monday-Friday, 8 am - 5 pm.  If you need to get in touch with me, you may either call me at my direct line or you can use [employer].  If you are unable to make your appointment, kindly call your our pharmacy scheduling line at [phone] at least 48 hours in advance to cancel and reschedule.  There are no supporting services by the pharmacy team on weekends and holidays, or after 5 PM on weekdays.      RETAIL PHARMACY     [facility]    [address]  [place] [id number]  Phone: [phone]  Hours: M-F: 8 am - 6 pm;   Saturday: 8 am - 4 pm; Sunday 9 am - 1 pm

## 2025-04-11 ENCOUNTER — PHARMACY VISIT (OUTPATIENT)
Dept: PHARMACY | Facility: CLINIC | Age: 55
End: 2025-04-11
Payer: MEDICARE

## 2025-04-16 ENCOUNTER — OFFICE VISIT (OUTPATIENT)
Dept: URGENT CARE | Age: 55
End: 2025-04-16
Payer: COMMERCIAL

## 2025-04-16 VITALS
DIASTOLIC BLOOD PRESSURE: 84 MMHG | RESPIRATION RATE: 16 BRPM | SYSTOLIC BLOOD PRESSURE: 153 MMHG | HEIGHT: 62 IN | OXYGEN SATURATION: 97 % | WEIGHT: 190 LBS | BODY MASS INDEX: 34.96 KG/M2 | TEMPERATURE: 98.3 F | HEART RATE: 81 BPM

## 2025-04-16 DIAGNOSIS — H66.93 BILATERAL ACUTE OTITIS MEDIA: Primary | ICD-10-CM

## 2025-04-16 PROCEDURE — 3077F SYST BP >= 140 MM HG: CPT | Performed by: NURSE PRACTITIONER

## 2025-04-16 PROCEDURE — 3079F DIAST BP 80-89 MM HG: CPT | Performed by: NURSE PRACTITIONER

## 2025-04-16 PROCEDURE — 3008F BODY MASS INDEX DOCD: CPT | Performed by: NURSE PRACTITIONER

## 2025-04-16 PROCEDURE — 99213 OFFICE O/P EST LOW 20 MIN: CPT | Performed by: NURSE PRACTITIONER

## 2025-04-16 PROCEDURE — 1036F TOBACCO NON-USER: CPT | Performed by: NURSE PRACTITIONER

## 2025-04-16 RX ORDER — METHYLPREDNISOLONE 4 MG/1
TABLET ORAL
Qty: 21 TABLET | Refills: 0 | Status: SHIPPED | OUTPATIENT
Start: 2025-04-16

## 2025-04-16 RX ORDER — AMOXICILLIN AND CLAVULANATE POTASSIUM 875; 125 MG/1; MG/1
875 TABLET, FILM COATED ORAL 2 TIMES DAILY
Qty: 20 TABLET | Refills: 0 | Status: SHIPPED | OUTPATIENT
Start: 2025-04-16

## 2025-04-16 NOTE — PATIENT INSTRUCTIONS
Supportive care - encourage clear fluids ( water, Pedialyte, ) , chicken broth/soup and warm fluids can be soothing as well.  Rest, adjust room temperature and humidity.  Use saline spray/drops as needed.  Tylenol or Motrin if needed for fever.   Follow up with PCP if you are not feeling any better.

## 2025-04-16 NOTE — PROGRESS NOTES
"Subjective   Patient ID: Camille Mchugh is a 54 y.o. female. They present today with a chief complaint of Cough (Coughing up yellow phlegm and left ear pain x 1 week).    History of Present Illness  Patient presents with complaints of ear pain. Right being worse than left. States she also has a cough and he has yellow phlegm. She did see ENT and has an appointment again with him in June.     Past Medical History  Allergies as of 04/16/2025 - Reviewed 04/16/2025   Allergen Reaction Noted    Sulfamethoxazole-trimethoprim Other, Hives, and Unknown 07/29/2020       Prescriptions Prior to Admission[1]     Medical History[2]    Surgical History[3]     reports that she has never smoked. She has never used smokeless tobacco. She reports that she does not drink alcohol and does not use drugs.    Review of Systems  Review of Systems     See HPI                          Objective    Vitals:    04/16/25 1305   BP: 153/84   Pulse: 81   Resp: 16   Temp: 36.8 °C (98.3 °F)   TempSrc: Oral   SpO2: 97%   Weight: 86.2 kg (190 lb)   Height: 1.575 m (5' 2\")     No LMP recorded.    Physical Exam  CONSTITUTIONAL: The general appearance and condition of the patient were examined.  Level of distress, nutrition, external development abnormality, and general behavior were noted.  No abnormal findings.  Vital signs as documented.      EARS: External appearance normal-no lesions, redness, or swelling. Mild discomfort during palpation; on otoscopic exam tympanic membranes and inner ear are red, and fluid is also noted behind the tympanic membrane. Hearing is intact.     CARDIOVASCULAR: The patient's heart examined for regular rate and rhythm and presence of murmurs. Note taken of any tachycardia, bradycardia or any irregular rhythm.  No abnormal findings.        RESPIRATORY/LUNGS: Chest examined for equal movement, bilaterally.  Lungs examined for equality of breath sounds. Presence or absence of rales noted bilaterally.  Examined for the presence " of diffuse or scattered wheezes.  No abnormal findings.      Procedures    Point of Care Test & Imaging Results from this visit  No results found for this visit on 25.   Imaging  No results found.    Cardiology, Vascular, and Other Imaging  No other imaging results found for the past 2 days      Diagnostic study results (if any) were reviewed by [facility].    Assessment/Plan   Allergies, medications, history, and pertinent labs/EKGs/Imaging reviewed by GATITO Cortez-CNP.     Medical Decision Making  Patient Instructions   Supportive care - encourage clear fluids ( water, Pedialyte, ) , chicken broth/soup and warm fluids can be soothing as well.  Rest, adjust room temperature and humidity.  Use saline spray/drops as needed.  Tylenol or Motrin if needed for fever.   Follow up with PCP if you are not feeling any better.    At time of discharge patient was clinically well-appearing and HDS for outpatient management. The patient and/or family was educated regarding diagnosis, supportive care, OTC and Rx medications. The patient and/or family was given the opportunity to ask questions prior to discharge.  They verbalized understanding of my discussion of the plans for treatment, expected course, indications to return to  or seek further evaluation in ED, and the need for timely follow up as directed.   They were provided with a work/school excuse if requested.    Orders and Diagnoses  There are no diagnoses linked to this encounter.    Medical Admin Record      Patient disposition: Home    Electronically signed by [facility]  1:07 PM           [1] (Not in a hospital admission)   [2]   Past Medical History:  Diagnosis Date    Personal history of other diseases of the circulatory system     History of hypertension    Sinusitis    [3]   Past Surgical History:  Procedure Laterality Date    GALLBLADDER SURGERY      OTHER SURGICAL HISTORY  2018     section

## 2025-04-18 ENCOUNTER — APPOINTMENT (OUTPATIENT)
Dept: PHARMACY | Facility: HOSPITAL | Age: 55
End: 2025-04-18
Payer: COMMERCIAL

## 2025-04-18 DIAGNOSIS — E66.9 OBESITY (BMI 30-39.9): ICD-10-CM

## 2025-04-18 DIAGNOSIS — I10 ESSENTIAL (PRIMARY) HYPERTENSION: ICD-10-CM

## 2025-04-18 DIAGNOSIS — E11.9 TYPE 2 DIABETES MELLITUS WITHOUT COMPLICATION, WITH NO HISTORY OF INSULIN USE: Primary | ICD-10-CM

## 2025-04-18 DIAGNOSIS — E78.1 HYPERTRIGLYCERIDEMIA: ICD-10-CM

## 2025-04-18 DIAGNOSIS — E78.5 HYPERLIPIDEMIA LDL GOAL <70: ICD-10-CM

## 2025-04-18 NOTE — PATIENT INSTRUCTIONS
MOUNJARO EDUCATION     Mounjaro is the first and only approved treatment in a different class of medication for type 2 diabetes.  It works differently than other type 2 diabetes medications by directly activating GIP and GLP-1 pathways to help regulate blood sugar.  GIP=glucose-dependent insulinotropic polypeptide; GLP-1=glucagon-like peptide-1  How Mounjaro works  Mounjaro is an injectable prescription medicine that is used along with diet and exercise to improve blood sugar (glucose) in adults with type 2 diabetes mellitus.  Mounjaro helps your body both regulate blood sugar and decrease how much food you eat  It may start working to lower blood sugar from the first dose.  The 2.5 mg starting dose is not meant for blood sugar control.  Mounjaro works in multiple ways. It helps:  The body release insulin when blood sugar is high  The body remove excess sugar from the blood  Stop the liver from making and releasing too much sugar  Reduce how much food is eaten  Slow down how quickly food leaves the stomach. This lessens over time  MOUNJARO is a single-dose prefilled pen.  MOUNJARO is used 1 time each week.  Inject under the skin (subcutaneously) only.  You or another person can inject into your stomach (abdomen) or thigh.  Change (rotate) your injection site with each weekly injection. Do not use the same site for each injection.  Another person can inject into the back of your upper arm.  You may give an injection of Mounjaro and insulin in the same body area (such as your stomach area), but not right next to each other.    If you take too much Mounjaro, call your healthcare provider or seek medical advice promptly.    Mounjaro may cause serious side effects, including:  Inflammation of the pancreas (pancreatitis). Stop using Mounjaro and call your healthcare provider right away if you have severe pain in your stomach area (abdomen) that will not go away, with or without vomiting. You may feel the pain from your  abdomen to your back.  Low blood sugar (hypoglycemia). Your risk for getting low blood sugar may be higher if you use Mounjaro with another medicine that can cause low blood sugar, such as a sulfonylurea or insulin. Signs and symptoms of low blood sugar may include dizziness or light-headedness, sweating, confusion or drowsiness, headache, blurred vision, slurred speech, shakiness, fast heartbeat, anxiety, irritability, or mood changes, hunger, weakness and feeling jittery.  Serious allergic reactions. Stop using Mounjaro and get medical help right away if you have any symptoms of a serious allergic reaction, including swelling of your face, lips, tongue or throat, problems breathing or swallowing, severe rash or itching, fainting or feeling dizzy, and very rapid heartbeat.  Kidney problems (kidney failure). In people who have kidney problems, diarrhea, nausea, and vomiting may cause a loss of fluids (dehydration), which may cause kidney problems to get worse. It is important for you to drink fluids to help reduce your chance of dehydration.  Severe stomach problems. Stomach problems, sometimes severe, have been reported in people who use Mounjaro. Tell your healthcare provider if you have stomach problems that are severe or will not go away.  Changes in vision. Tell your healthcare provider if you have changes in vision during treatment with Mounjaro.  Gallbladder problems. Gallbladder problems have happened in some people who use Mounjaro. Tell your healthcare provider right away if you get symptoms of gallbladder problems, which may include pain in your upper stomach (abdomen), fever, yellowing of skin or eyes (jaundice), and clotilde-colored stools.  Food or liquid getting into the lungs during surgery or other procedures that use anesthesia or deep sleepiness (deep sedation). Mounjaro may increase the chance of food getting into your lungs during surgery or other procedures. Tell all your healthcare providers that  you are taking Mounjaro before you are scheduled to have surgery or other procedures.    Preparing to inject MOUNJARO  Remove the Pen from the refrigerator.  Leave the gray base cap on until you are ready to inject.  Check the Pen label to make sure you have the right medicine and dose, and that it has not .  Inspect the Pen to make sure that it is not damaged.  Make sure the medicine:  is not frozen  is not cloudy  is colorless to slightly yellow  does not have particles  Choose your injection site  You or another person can inject the medicine in your stomach (abdomen) or thigh.  Make sure the Pen is locked.  Do not unlock the Pen until you place the clear base on your skin and are ready to inject.  Pull the gray base cap straight off and throw it away in your household trash.  Do not put the gray base cap back on - this could damage the needle.  Do not touch the needle  Place clear base on skin, then unlock  Place the clear base flat against your skin at the injection site.  Unlock by turning the lock ring.  Press and hold the purple injection button for up to 10 seconds.  Listen for:  First click = injection started  Second click = injection completed  You will know your injection is complete when the gray plunger is visible.    Disposing of your used Pen  Put your used Pen in an FDA-cleared sharps disposal container right away after use. Do not throw away (dispose of) Pens in your household trash.  If you do not have an FDA-cleared sharps disposal container, you may use a household container that is: made of a heavy-duty plastic, can be closed with a tight-fitting, puncture-resistant lid, without sharps being able to come out, upright and stable during use, leak-resistant, and properly labeled to warn of hazardous waste inside the container.  When your sharps disposal container is almost full, you will need to follow your community guidelines for the right way to dispose of your sharps disposal  container. There may be state or local laws about how you should throw away used needles and syringes.   For more information about safe sharps disposal, and for specific information about sharps disposal in the state that you live in, go to the FDA's website at: http://www.fda.gov/safesharpsdisposal.  Do not recycle your used sharps disposal container.    Storage and handling  Store your Pen in the refrigerator between 36°F to 46°F (2°C to 8°C).  You may store your Pen at room temperature up to 86°F (30°C) for up to 21 days.  Do not freeze your Pen. If the Pen has been frozen, throw the Pen away and use a new Pen.  Store your Pen in the original carton to protect your Pen from light.  The Pen has glass parts. Handle it carefully. If you drop the Pen on a hard surface, do not use it. Use a new Pen for your injection.  Keep your MOUNJARO Pen and all medicines out of the reach of children.    Commonly asked questions  What if I see air bubbles in my Pen?  Air bubbles are normal.  What if my Pen is not at room temperature?  It is not necessary to warm the Pen to room temperature.  What if I unlock the Pen and press the purple injection button before pulling off the gray base cap?  Do not remove the gray base cap.   Throw away the Pen and get a new Pen.  What if there is a drop of liquid on the tip of the needle when I remove the gray base cap?  A drop of liquid on the tip of the needle is normal. Do not touch the needle.  Do I need to hold the injection button down until the injection is complete?  This is not necessary, but it may help you keep the Pen steady against your skin.  I heard more than 2 clicks during my injection--2 loud clicks and 1 soft one. Did I get my complete injection?  Some people may hear a soft click right before the second loud click. That is the normal operation of the Pen. Do not remove the Pen from your skin until you hear the second loud click.  I am not sure if my Pen worked the right  way.  Check to see if you have received your dose. Your dose was delivered the right way if the gray plunger is visible.   If you do not see the nuñez plunger, contact Erika at 2-822-[facility]-Rx ([phone]) for further instructions. Until then, store your Pen safely to avoid an accidental needle stick.

## 2025-04-18 NOTE — PROGRESS NOTES
Clinical Pharmacy Appointment    Patient ID: [id number] Camille Mchugh is a 54 y.o. female who presents for Follow Up appointment for weight loss/ pre-diabetes. PCP and Referring Provider: Keyanna Levy MD   Last visit with PCP: 3/25/25   Next visit with PCP: 9/25/25    Subjective     PMH:   Past Medical History:   Diagnosis Date    Personal history of other diseases of the circulatory system     History of hypertension    Sinusitis       Family Hx:  family history includes Eczema in an other family member; environmental allergies in an other family member.     Social Hx:   reports that she has never smoked. She has never used smokeless tobacco. She reports that she does not drink alcohol and does not use drugs.     HISTORY OF PRESENT ILLNESS    WEIGHT LOSS/DIABETES MELLITUS 2 - NEW DIAGNOSIS    BMI Readings from Last 3 Encounters:   04/16/25 34.75 kg/m²   03/25/25 34.93 kg/m²   11/20/24 33.91 kg/m²     CLASS II OBESITY  Starting weight: 191 lbs. (86.6 kg)  Current weight:  In-Office  3/25/25 - 191 lbs  Does have a scale at home, but does not weigh self at home    Weight Related Complications   Prediabetes, type 2 diabetes mellitus (T2D), dyslipidemia, hypertension, metabolic syndrome, cardiovascular disease, nonalcoholic fatty liver disease, osteoarthritis, depression   Obstructive sleep apnea, asthma and reactive airway disease, and gastroesophageal reflux disease    Potential Contributing Factors  Alcohol use: Average 0 drinks/week  Tobacco use: No  Other drug use: No  Medications that may cause weight gain: none  Mental health: No current concerns (seasonal depression)  Eating Disorders: Denies Hx of any eating disorder  Comorbidities: Comorbidities: depressed mood, high cholesterol, and hypertension controlled with oral meds  Constipation, excess gas    Lifestyle  Goal  No more potato chips  Decrease sweets  Eats minimal  Lost 5-6 lbs last year and plateau'ed  Has always struggled with weight loss  Diet: 3  meals/day  Does not over eat, or eat large portions  Eats small meals at work  BK: eggs, sausage   LN: lunch-able  DN: small plates of home cooked meals  Snacks: chips occasionally, nuts, mini candy car, apple, grapes  Eliminated most junk food  Drinks: lots of water  Eats out x1 a week  Carry out: long horn  Has worked with nutritionist/dietician? 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 No, exclusion   Eligible for copay cards/programs? Yes, if PA approved - antidiabetics only  Eligible for  PAP? Yes, reports income <400% FPL      Medication Reconciliation:  OTCs? Added zyrtec and sudafed prn for seasonal allergies    Drug Problems  No relevant drug interactions were noted.    Medication System Management  Patient's preferred pharmacy:     [facility] [address] [place] [address] [address]  [facility] [address]  [facility] [address]  Phone: [phone] Fax: [phone]    [facility]  [address], [address]  [facility] [address]  Phone: [phone] Fax: [phone]     Adherence/Medication Access:   Denies missed doses  Does not have coverage for weight loss  Mounjaro/Ozempic require PA    Allergies   Reviewed? Yes  Changes? No    Current Outpatient Medications on File Prior to Visit   Medication Sig Dispense Refill    amoxicillin-clavulanate (Augmentin) 875-125 mg tablet Take 1 tablet (875 mg) by mouth 2 times a day. 20 tablet 0    atorvastatin (Lipitor) 40 mg tablet Take 1  tablet (40 mg) by mouth once daily. 90 tablet 3    azelastine (Astelin) 137 mcg (0.1 %) nasal spray Administer 1 spray into each nostril 2 times a day. Use in each nostril as directed      cetirizine (ZyrTEC) 5 mg tablet Take 2 tablets (10 mg) by mouth once daily as needed for allergies.      docusate sodium (COLACE ORAL) Take by mouth if needed.      enalapril (Vasotec) 10 mg tablet       ergocalciferol (Vitamin D-2) 1250 mcg (50,000 units) capsule Take 1 capsule (50,000 Units) by mouth 1 (one) time per week. 16 capsule 1    methylPREDNISolone (Medrol Dospak) 4 mg tablets Follow schedule on package instructions 21 tablet 0    polyethylene glycol (Glycolax, Miralax) 17 gram packet Take 17 g by mouth if needed.      pseudoephedrine (Sudafed) 30 mg tablet Take 1 tablet (30 mg) by mouth every 6 hours if needed for congestion.      tirzepatide (Mounjaro) 2.5 mg/0.5 mL pen injector Inject 2.5 mg under the skin 1 (one) time per week. 2 mL 0     No current facility-administered medications on file prior to visit.     Lab Results   Component Value Date    BILITOT 0.4 03/31/2025    CALCIUM 9.1 03/31/2025    CO2 30 03/31/2025     03/31/2025    CREATININE 0.65 03/31/2025    GLUCOSE 144 (H) 03/31/2025    ALKPHOS 98 03/31/2025    K 4.3 03/31/2025    PROT 6.7 03/31/2025     03/31/2025    AST 22 03/31/2025    ALT 25 03/31/2025    BUN 14 03/31/2025    ANIONGAP 7 03/31/2025    MG 1.9 03/31/2025    PHOS 4.6 (H) 03/31/2025    ALBUMIN 4.1 03/31/2025    EGFR 104 03/31/2025     Lab Results   Component Value Date    TRIG 377 (H) 03/31/2025    CHOL 169 03/31/2025    LDLCALC 86 03/31/2025    HDL 33 (L) 03/31/2025     Lab Results   Component Value Date    HGBA1C 6.5 (H) 03/31/2025     ASSESSMENT AND PLAN     Patient has not received medication and does not know why it isnt available to  at [facility], sending back to [facility]    Patient Goals   Stage 1 or stage 2 lose at least 5% of their body weight  AACE/ACE guidelines recommend  "a loss of at least 10% of body weight for many complication-specific targets  Because early weight loss helps predict sustained weight reduction, the AACE/ACE recommend targeting 2.5% weight loss within 1 month for all patients with overweight or obesity.   All patients with overweight or obesity achieve the realistic and meaningful goal of 5% to 10% weight loss within 6 months.  Diabetes  Fasting B - 130 mg/dL  Postprandial BG: less than 180 mg/dL  A1c: less than 6.5% - not at goal  Weight Loss  BMI <25 - not at goal  2.5% weight loss in one month   At least 10% weight loss in 6 months  Cholesterol  LDL-C <70 - not at goal  TG <150 - not at goal  Blood Pressure   /80 - at goal    Assessment and Plan  Current regimen includes diet and mild exercise. Patient's current weight reported as 191 lbs. Has lost 0 lbs (0% of TBW) and has not started therapy plan.   Patient described a generally healthy diet, with exception to cholesterol/TG. Patient to improve diet and continue to increase her exercise including 3-5 days of moderate intensity cardio and slowly increasing her daily step goal. Patient agreeable to this plan. Start mounjaro for weight loss, T2DM, and CVD/renal protection.  Will repeat labs in 6-12 weeks. TG and LDL-c not at goal, continue the increased dose of statin.  Weight Loss Plan: Start Mounjaro 2.5 mg subcutaneously once weekly  The goals of therapy are not \"being met\" with the current medication regimen.    Medication Changes: none - patient is sick with acute otitis media and is on a 7 day course of an abx. Just to be cautios, wait to start mounjaro until she finishes her course of abx.   INCREASE:   Atorvastatin 40 mg once daily  START:   Vit D 50 k units once weekly   Mounjaro 2.5 mg under the skin once weekly, plan to increase dose according to standard titration plan pending patient tolerability and therapeutic response.   Mounjaro 5 mg under the skin once weekly x 4 weeks  Mounjaro 7.5 " mg under the skin once weekly x 4 weeks  Mounjaro 10 mg under the skin once weekly x 4 weeks  Mounjaro 12.5 mg under the skin once weekly x 4 weeks  Mounjaro 15 mg under the skin once weekly as maintenance  * option to stop titration at any point and continue as maintenance dose *     Future Considerations:  Immunizations: ensure up to date   Mounjaro  Increase statin    Monitoring:  Patient states her PCP said that a repeat hba1c may be more accurate than the POCT performed at their last clinic visit     Education:   Diet   Exercise  Medication  Counseled patient on MOA, expectations, side effects, duration of therapy, contraindications, administration, and monitoring parameters  Answered all patient questions and concerns; provided [facility] phone number if issues/questions arise    Patient Instructions:  Get hba1c lab - checked all ordered labs should be $0 copay for patient  Increase dietary fiber    Clinical Pharmacist follow-up: 4 weeks Telehealth visit  - check in after 3 doses of mounjaro, increase of statin, and evaluate diet/excercise    Continue all meds under the continuation of care with the referring provider and clinical pharmacy team.    Malou Elaine, PharmD   Clinical Pharmacist Specialist, Primary Care   Phone: [phone]   Fax: [phone]   Email: [email or web address]    Verbal consent to manage patient's drug therapy was obtained from the patient. They were informed they may decline to participate or withdraw from participation in pharmacy services at any time

## 2025-05-13 ENCOUNTER — APPOINTMENT (OUTPATIENT)
Dept: PHARMACY | Facility: HOSPITAL | Age: 55
End: 2025-05-13
Payer: COMMERCIAL

## 2025-05-13 DIAGNOSIS — I10 ESSENTIAL (PRIMARY) HYPERTENSION: ICD-10-CM

## 2025-05-13 DIAGNOSIS — E66.9 OBESITY (BMI 30-39.9): ICD-10-CM

## 2025-05-13 DIAGNOSIS — E11.9 TYPE 2 DIABETES MELLITUS WITHOUT COMPLICATION, WITH NO HISTORY OF INSULIN USE: ICD-10-CM

## 2025-05-13 DIAGNOSIS — E78.5 HYPERLIPIDEMIA LDL GOAL <70: ICD-10-CM

## 2025-05-13 DIAGNOSIS — E78.1 HYPERTRIGLYCERIDEMIA: ICD-10-CM

## 2025-05-13 PROCEDURE — RXMED WILLOW AMBULATORY MEDICATION CHARGE

## 2025-05-13 RX ORDER — TIRZEPATIDE 5 MG/.5ML
5 INJECTION, SOLUTION SUBCUTANEOUS WEEKLY
Qty: 2 ML | Refills: 0 | Status: SHIPPED | OUTPATIENT
Start: 2025-05-13

## 2025-05-13 NOTE — PROGRESS NOTES
Clinical Pharmacy Appointment    Patient ID: [id number] Camille Mchugh is a 55 y.o. female who presents for Follow Up appointment for weight loss/ pre-diabetes. PCP and Referring Provider: Keyanna Levy MD   Last visit with PCP: 3/25/25   Next visit with PCP: 9/25/25    Subjective     PMH:   Past Medical History:   Diagnosis Date    Personal history of other diseases of the circulatory system     History of hypertension    Sinusitis       Family Hx:  family history includes Eczema in an other family member; environmental allergies in an other family member.     Social Hx:   reports that she has never smoked. She has never used smokeless tobacco. She reports that she does not drink alcohol and does not use drugs.     HISTORY OF PRESENT ILLNESS    WEIGHT LOSS/DIABETES MELLITUS 2 - NEW DIAGNOSIS    BMI Readings from Last 3 Encounters:   04/16/25 34.75 kg/m²   03/25/25 34.93 kg/m²   11/20/24 33.91 kg/m²     CLASS II OBESITY  Starting weight: 191 lbs. (86.6 kg)  Current weight:  In-Office  3/25/25 - 191 lbs  5/13/25 - 187 lbs  Does have a scale at home, but does not weigh self at home    Weight Related Complications   Prediabetes, type 2 diabetes mellitus (T2D), dyslipidemia, hypertension, metabolic syndrome, cardiovascular disease, nonalcoholic fatty liver disease, osteoarthritis, depression   Obstructive sleep apnea, asthma and reactive airway disease, and gastroesophageal reflux disease    Potential Contributing Factors  Alcohol use: Average 0 drinks/week  Tobacco use: No  Other drug use: No  Medications that may cause weight gain: none  Mental health: No current concerns (seasonal depression)  Eating Disorders: Denies Hx of any eating disorder  Comorbidities: Comorbidities: depressed mood, high cholesterol, and hypertension controlled with oral meds  Constipation, excess gas    Lifestyle  Goal  No more potato chips  Decrease sweets  Eats minimal  Lost 5-6 lbs last year and plateau'ed  Has always struggled with weight  loss  Diet: 3 meals/day  Does not over eat, or eat large portions  Eats small meals at work  BK: eggs, sausage   LN: lunch-able  DN: small plates of home cooked meals  Snacks: chips occasionally, nuts, mini candy car, apple, grapes  Eliminated most junk food  Drinks: lots of water  Eats out x1 a week  Carry out: long horn  Has worked with nutritionist/dietician? 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 No, exclusion   Eligible for copay cards/programs? Yes, PA approved  Eligible for  PAP? Yes, reports income <400% FPL      Medication Reconciliation:  OTCs? Added zyrtec and sudafed prn for seasonal allergies    Drug Problems  No relevant drug interactions were noted.    Medication System Management  Patient's preferred pharmacy:     [place] [address] [address]  [facility] [address]  [place] [address]  Phone: [phone] Fax: [phone]    [facility]  [address], [address]  [place] [id number]  Phone: [phone] Fax: [phone]     Adherence/Medication Access:   Denies missed doses  Does not have coverage for weight loss    Allergies   Reviewed? Yes  Changes? No    Current Outpatient Medications on File Prior to Visit   Medication Sig Dispense Refill    atorvastatin (Lipitor) 40 mg tablet Take 1 tablet (40 mg) by mouth once daily. 90 tablet 3    azelastine (Astelin) 137 mcg (0.1 %) nasal spray Administer 1  spray into each nostril 2 times a day. Use in each nostril as directed      cetirizine (ZyrTEC) 5 mg tablet Take 2 tablets (10 mg) by mouth once daily as needed for allergies.      docusate sodium (COLACE ORAL) Take by mouth if needed.      enalapril (Vasotec) 10 mg tablet       ergocalciferol (Vitamin D-2) 1250 mcg (50,000 units) capsule Take 1 capsule (50,000 Units) by mouth 1 (one) time per week. 16 capsule 1    polyethylene glycol (Glycolax, Miralax) 17 gram packet Take 17 g by mouth if needed.      pseudoephedrine (Sudafed) 30 mg tablet Take 1 tablet (30 mg) by mouth every 6 hours if needed for congestion.      tirzepatide (Mounjaro) 2.5 mg/0.5 mL pen injector Inject 2.5 mg under the skin 1 (one) time per week. 2 mL 0    [DISCONTINUED] amoxicillin-clavulanate (Augmentin) 875-125 mg tablet Take 1 tablet (875 mg) by mouth 2 times a day. 20 tablet 0    [DISCONTINUED] methylPREDNISolone (Medrol Dospak) 4 mg tablets Follow schedule on package instructions 21 tablet 0     No current facility-administered medications on file prior to visit.     Lab Results   Component Value Date    BILITOT 0.4 03/31/2025    CALCIUM 9.1 03/31/2025    CO2 30 03/31/2025     03/31/2025    CREATININE 0.65 03/31/2025    GLUCOSE 144 (H) 03/31/2025    ALKPHOS 98 03/31/2025    K 4.3 03/31/2025    PROT 6.7 03/31/2025     03/31/2025    AST 22 03/31/2025    ALT 25 03/31/2025    BUN 14 03/31/2025    ANIONGAP 7 03/31/2025    MG 1.9 03/31/2025    PHOS 4.6 (H) 03/31/2025    ALBUMIN 4.1 03/31/2025    EGFR 104 03/31/2025     Lab Results   Component Value Date    TRIG 377 (H) 03/31/2025    CHOL 169 03/31/2025    LDLCALC 86 03/31/2025    HDL 33 (L) 03/31/2025     Lab Results   Component Value Date    HGBA1C 6.5 (H) 03/31/2025     ASSESSMENT AND PLAN     Patient has not received medication and does not know why it isnt available to  at [facility], sending back to [facility]    Patient Goals   Stage 1 or stage 2 lose at least 5% of their body  "weight  AACE/ACE guidelines recommend a loss of at least 10% of body weight for many complication-specific targets  Because early weight loss helps predict sustained weight reduction, the AACE/ACE recommend targeting 2.5% weight loss within 1 month for all patients with overweight or obesity.   All patients with overweight or obesity achieve the realistic and meaningful goal of 5% to 10% weight loss within 6 months.  Diabetes  Fasting B - 130 mg/dL  Postprandial BG: less than 180 mg/dL  A1c: less than 6.5% - not at goal  Weight Loss  BMI <25 - not at goal  2.5% weight loss in one month   At least 10% weight loss in 6 months  Cholesterol  LDL-C <70 - not at goal  TG <150 - not at goal  Blood Pressure   /80 - at goal    Assessment and Plan  Current regimen includes diet and mild exercise. Patient's current weight reported as 191 lbs. Has lost 0 lbs (0% of TBW) and has not started therapy plan.   Patient described a generally healthy diet, with exception to cholesterol/TG. Patient to improve diet and continue to increase her exercise including 3-5 days of moderate intensity cardio and slowly increasing her daily step goal. Patient agreeable to this plan. CONTINUE mounjaro for weight loss, T2DM, and CVD/renal protection.  Will repeat labs in 6 weeks. TG and LDL-c not at goal, continue the increased dose of statin.  Weight Loss Plan: Start Mounjaro 2.5 mg subcutaneously once weekly  The goals of therapy are not \"being met\" with the current medication regimen.    Medication Changes: none - patient is sick with acute otitis media and is on a 7 day course of an abx. Just to be cautios, wait to start mounjaro until she finishes her course of abx.   CONTINUE:   Atorvastatin 40 mg once daily  INCREASE:   Mounjaro 5 mg under the skin once weekly x 4 weeks  Mounjaro 7.5 mg under the skin once weekly x 4 weeks  Mounjaro 10 mg under the skin once weekly x 4 weeks  Mounjaro 12.5 mg under the skin once weekly x 4 " weeks  Mounjaro 15 mg under the skin once weekly as maintenance  * option to stop titration at any point and continue as maintenance dose *     Future Considerations:  Immunizations: ensure up to date   Mounjaro  Increase statin    Monitoring:  Patient states her PCP said that a repeat hba1c may be more accurate than the POCT performed at their last clinic visit     Education:   Diet   Exercise  Medication  Counseled patient on MOA, expectations, side effects, duration of therapy, contraindications, administration, and monitoring parameters  Answered all patient questions and concerns; provided [facility] phone number if issues/questions arise    Patient Instructions:  Get hba1c lab - checked all ordered labs should be $0 copay for patient  Increase dietary fiber    Clinical Pharmacist follow-up: 3 weeks Telehealth visit  - check in after 3 doses of mounjaro, increase of statin, and evaluate diet/excercise    Continue all meds under the continuation of care with the referring provider and clinical pharmacy team.    Malou Elaine PharmD   Clinical Pharmacist Specialist, Primary Care   Phone: [phone]   Fax: [phone]   Email: [email or web address]    Verbal consent to manage patient's drug therapy was obtained from the patient. They were informed they may decline to participate or withdraw from participation in pharmacy services at any time

## 2025-05-14 PROCEDURE — RXMED WILLOW AMBULATORY MEDICATION CHARGE

## 2025-05-17 ENCOUNTER — PHARMACY VISIT (OUTPATIENT)
Dept: PHARMACY | Facility: CLINIC | Age: 55
End: 2025-05-17
Payer: MEDICARE

## 2025-05-19 ENCOUNTER — PHARMACY VISIT (OUTPATIENT)
Dept: PHARMACY | Facility: CLINIC | Age: 55
End: 2025-05-19
Payer: MEDICARE

## 2025-06-07 DIAGNOSIS — E78.1 HYPERTRIGLYCERIDEMIA: ICD-10-CM

## 2025-06-07 DIAGNOSIS — I10 ESSENTIAL (PRIMARY) HYPERTENSION: ICD-10-CM

## 2025-06-07 DIAGNOSIS — E66.9 OBESITY (BMI 30-39.9): ICD-10-CM

## 2025-06-07 DIAGNOSIS — E11.9 TYPE 2 DIABETES MELLITUS WITHOUT COMPLICATION, WITH NO HISTORY OF INSULIN USE: ICD-10-CM

## 2025-06-07 DIAGNOSIS — E78.5 HYPERLIPIDEMIA LDL GOAL <70: ICD-10-CM

## 2025-06-09 ENCOUNTER — APPOINTMENT (OUTPATIENT)
Dept: PHARMACY | Facility: HOSPITAL | Age: 55
End: 2025-06-09
Payer: COMMERCIAL

## 2025-06-09 DIAGNOSIS — E11.9 TYPE 2 DIABETES MELLITUS WITHOUT COMPLICATION, WITH NO HISTORY OF INSULIN USE: ICD-10-CM

## 2025-06-09 DIAGNOSIS — E66.9 OBESITY (BMI 30-39.9): ICD-10-CM

## 2025-06-09 DIAGNOSIS — I10 ESSENTIAL (PRIMARY) HYPERTENSION: ICD-10-CM

## 2025-06-09 DIAGNOSIS — E78.1 HYPERTRIGLYCERIDEMIA: ICD-10-CM

## 2025-06-09 DIAGNOSIS — E78.5 HYPERLIPIDEMIA LDL GOAL <70: ICD-10-CM

## 2025-06-09 PROCEDURE — RXMED WILLOW AMBULATORY MEDICATION CHARGE

## 2025-06-09 RX ORDER — TIRZEPATIDE 5 MG/.5ML
5 INJECTION, SOLUTION SUBCUTANEOUS WEEKLY
Qty: 2 ML | Refills: 0 | OUTPATIENT
Start: 2025-06-09

## 2025-06-09 RX ORDER — TIRZEPATIDE 5 MG/.5ML
5 INJECTION, SOLUTION SUBCUTANEOUS WEEKLY
Qty: 2 ML | Refills: 0 | Status: SHIPPED | OUTPATIENT
Start: 2025-06-09

## 2025-06-09 NOTE — PROGRESS NOTES
Clinical Pharmacy Appointment    Patient ID: [id number] Camille Mchugh is a 55 y.o. female who presents for Follow Up appointment for weight loss/ pre-diabetes. PCP and Referring Provider: Keyanna Levy MD   Last visit with PCP: 3/25/25   Next visit with PCP: 9/25/25    Subjective     PMH:   Past Medical History:   Diagnosis Date    Personal history of other diseases of the circulatory system     History of hypertension    Sinusitis       Family Hx:  family history includes Eczema in an other family member; environmental allergies in an other family member.     Social Hx:   reports that she has never smoked. She has never used smokeless tobacco. She reports that she does not drink alcohol and does not use drugs.     HISTORY OF PRESENT ILLNESS    WEIGHT LOSS/DIABETES MELLITUS 2 - NEW DIAGNOSIS    BMI Readings from Last 3 Encounters:   04/16/25 34.75 kg/m²   03/25/25 34.93 kg/m²   11/20/24 33.91 kg/m²     CLASS II OBESITY  Starting weight: 191 lbs. (86.6 kg)  Current weight:  In-Office  3/25/25 - 191 lbs  5/13/25 - 187 lbs  6/9/25 - 183 lbs  Does have a scale at home, but does not weigh self at home    Weight Related Complications   Prediabetes, type 2 diabetes mellitus (T2D), dyslipidemia, hypertension, metabolic syndrome, cardiovascular disease, nonalcoholic fatty liver disease, osteoarthritis, depression   Obstructive sleep apnea, asthma and reactive airway disease, and gastroesophageal reflux disease    Potential Contributing Factors  Alcohol use: Average 0 drinks/week  Tobacco use: No  Other drug use: No  Medications that may cause weight gain: none  Mental health: No current concerns (seasonal depression)  Eating Disorders: Denies Hx of any eating disorder  Comorbidities: Comorbidities: depressed mood, high cholesterol, and hypertension controlled with oral meds  Constipation, excess gas    Lifestyle  Goal  No more potato chips  Decrease sweets  Eats minimal  Lost 5-6 lbs last year and plateau'ed  Has always  struggled with weight loss  Diet: 3 meals/day  Does not over eat, or eat large portions  Eats small meals at work  BK: eggs, sausage   LN: lunch-able  DN: small plates of home cooked meals  Snacks: chips occasionally, nuts, mini candy car, apple, grapes  Eliminated most junk food  Drinks: lots of water  Eats out x1 a week  Carry out: long horn  Has worked with nutritionist/dietician? 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 No, exclusion   Eligible for copay cards/programs? Yes, PA approved  Eligible for  PAP? Yes, reports income <400% FPL      Medication Reconciliation:  OTCs? Added zyrtec and sudafed prn for seasonal allergies    Drug Problems  No relevant drug interactions were noted.    Medication System Management  Patient's preferred pharmacy:     [facility] [address] [place] [address] [address]  [facility] [address] [place] [address]  Phone: [phone] Fax: [phone]    [facility]  [address], [address]  [facility] [address]  Phone: [phone] Fax: [phone]     Adherence/Medication Access:   Denies missed doses  Does not have coverage for weight loss    Allergies   Reviewed? Yes  Changes? No    Current Outpatient Medications on File Prior to Visit   Medication Sig Dispense Refill    atorvastatin (Lipitor) 40 mg tablet Take 1 tablet (40 mg) by mouth once  daily. 90 tablet 3    azelastine (Astelin) 137 mcg (0.1 %) nasal spray Administer 1 spray into each nostril 2 times a day. Use in each nostril as directed      cetirizine (ZyrTEC) 5 mg tablet Take 2 tablets (10 mg) by mouth once daily as needed for allergies.      docusate sodium (COLACE ORAL) Take by mouth if needed.      enalapril (Vasotec) 10 mg tablet       ergocalciferol (Vitamin D-2) 1250 mcg (50,000 units) capsule Take 1 capsule (50,000 Units) by mouth 1 (one) time per week. 16 capsule 1    polyethylene glycol (Glycolax, Miralax) 17 gram packet Take 17 g by mouth if needed.      pseudoephedrine (Sudafed) 30 mg tablet Take 1 tablet (30 mg) by mouth every 6 hours if needed for congestion.      tirzepatide (Mounjaro) 5 mg/0.5 mL pen injector Inject 5 mg under the skin 1 (one) time per week. 2 mL 0     No current facility-administered medications on file prior to visit.     Lab Results   Component Value Date    BILITOT 0.4 03/31/2025    CALCIUM 9.1 03/31/2025    CO2 30 03/31/2025     03/31/2025    CREATININE 0.65 03/31/2025    GLUCOSE 144 (H) 03/31/2025    ALKPHOS 98 03/31/2025    K 4.3 03/31/2025    PROT 6.7 03/31/2025     03/31/2025    AST 22 03/31/2025    ALT 25 03/31/2025    BUN 14 03/31/2025    ANIONGAP 7 03/31/2025    MG 1.9 03/31/2025    PHOS 4.6 (H) 03/31/2025    ALBUMIN 4.1 03/31/2025    EGFR 104 03/31/2025     Lab Results   Component Value Date    TRIG 377 (H) 03/31/2025    CHOL 169 03/31/2025    LDLCALC 86 03/31/2025    HDL 33 (L) 03/31/2025     Lab Results   Component Value Date    HGBA1C 6.5 (H) 03/31/2025     ASSESSMENT AND PLAN     Patient Goals   Stage 1 or stage 2 lose at least 5% of their body weight  AACE/ACE guidelines recommend a loss of at least 10% of body weight for many complication-specific targets  Because early weight loss helps predict sustained weight reduction, the AACE/ACE recommend targeting 2.5% weight loss within 1 month for all patients with overweight or obesity.  "  All patients with overweight or obesity achieve the realistic and meaningful goal of 5% to 10% weight loss within 6 months.  Diabetes  Fasting B - 130 mg/dL  Postprandial BG: less than 180 mg/dL  A1c: less than 6.5% - not at goal  Weight Loss  BMI <25 - not at goal  2.5% weight loss in one month   At least 10% weight loss in 6 months  Cholesterol  LDL-C <70 - not at goal  TG <150 - not at goal  Blood Pressure   /80 - at goal    Assessment and Plan  Current regimen includes diet, exercise, and mounjaro. Patient's current weight reported as 183 lbs. Has lost 8 lbs (4% of TBW) and is on week 7 of ttherapy plan.   Patient described a generally healthy diet, with exception to cholesterol/TG. Patient to improve diet and continue to increase her exercise including 3-5 days of moderate intensity cardio and slowly increasing her daily step goal. Patient agreeable to this plan. CONTINUE mounjaro for weight loss, T2DM, and CVD/renal protection.  Will repeat labs in 3 weeks. TG and LDL-c not at goal, continue the increased dose of statin. Patient having moderate nausea and would like to continue the 5 mg dose. I am agreeable to this plan  Weight Loss Plan: Increase Mounjaro 5 mg subcutaneously once weekly  The goals of therapy are not \"being met\" with the current medication regimen.  CONTINUE:   Atorvastatin 40 mg once daily  Mounjaro 5 mg under the skin once weekly   Mounjaro 7.5 mg under the skin once weekly x 4 weeks  Mounjaro 10 mg under the skin once weekly x 4 weeks  Mounjaro 12.5 mg under the skin once weekly x 4 weeks  Mounjaro 15 mg under the skin once weekly as maintenance  * option to stop titration at any point and continue as maintenance dose *     Future Considerations:  Immunizations: ensure up to date   Mounjaro  Increase statin    Monitoring:  Patient states her PCP said that a repeat hba1c may be more accurate than the POCT performed at their last clinic visit     Education:   Diet "   Exercise  Medication  Counseled patient on MOA, expectations, side effects, duration of therapy, contraindications, administration, and monitoring parameters  Answered all patient questions and concerns; provided [facility] phone number if issues/questions arise    Patient Instructions:  Get hba1c lab - checked all ordered labs should be $0 copay for patient  Increase dietary fiber    Clinical Pharmacist follow-up: 3 weeks Telehealth visit  - check in after 3 doses of mounjaro, increase of statin, and evaluate diet/excercise    Continue all meds under the continuation of care with the referring provider and clinical pharmacy team.    Malou Elaine, Frank   Clinical Pharmacist Specialist, Primary Care   Phone: [phone]   Fax: [phone]   Email: [email or web address]    Verbal consent to manage patient's drug therapy was obtained from the patient. They were informed they may decline to participate or withdraw from participation in pharmacy services at any time

## 2025-06-11 ENCOUNTER — PHARMACY VISIT (OUTPATIENT)
Dept: PHARMACY | Facility: CLINIC | Age: 55
End: 2025-06-11
Payer: MEDICARE

## 2025-06-17 ENCOUNTER — APPOINTMENT (OUTPATIENT)
Dept: SURGERY | Facility: CLINIC | Age: 55
End: 2025-06-17
Payer: COMMERCIAL

## 2025-06-25 DIAGNOSIS — Z12.31 ENCOUNTER FOR SCREENING MAMMOGRAM FOR BREAST CANCER: ICD-10-CM

## 2025-06-26 ENCOUNTER — APPOINTMENT (OUTPATIENT)
Dept: OTOLARYNGOLOGY | Facility: CLINIC | Age: 55
End: 2025-06-26
Payer: COMMERCIAL

## 2025-07-05 DIAGNOSIS — E78.1 HYPERTRIGLYCERIDEMIA: ICD-10-CM

## 2025-07-05 DIAGNOSIS — E11.9 TYPE 2 DIABETES MELLITUS WITHOUT COMPLICATION, WITH NO HISTORY OF INSULIN USE: ICD-10-CM

## 2025-07-05 DIAGNOSIS — I10 ESSENTIAL (PRIMARY) HYPERTENSION: ICD-10-CM

## 2025-07-05 DIAGNOSIS — E78.5 HYPERLIPIDEMIA LDL GOAL <70: ICD-10-CM

## 2025-07-05 DIAGNOSIS — E66.9 OBESITY (BMI 30-39.9): ICD-10-CM

## 2025-07-07 ENCOUNTER — APPOINTMENT (OUTPATIENT)
Dept: PHARMACY | Facility: HOSPITAL | Age: 55
End: 2025-07-07
Payer: COMMERCIAL

## 2025-07-07 DIAGNOSIS — I10 ESSENTIAL (PRIMARY) HYPERTENSION: ICD-10-CM

## 2025-07-07 DIAGNOSIS — E66.9 OBESITY (BMI 30-39.9): ICD-10-CM

## 2025-07-07 DIAGNOSIS — E78.5 HYPERLIPIDEMIA LDL GOAL <70: ICD-10-CM

## 2025-07-07 DIAGNOSIS — E78.1 HYPERTRIGLYCERIDEMIA: ICD-10-CM

## 2025-07-07 DIAGNOSIS — E11.9 TYPE 2 DIABETES MELLITUS WITHOUT COMPLICATION, WITH NO HISTORY OF INSULIN USE: ICD-10-CM

## 2025-07-07 PROCEDURE — RXMED WILLOW AMBULATORY MEDICATION CHARGE

## 2025-07-07 RX ORDER — TIRZEPATIDE 5 MG/.5ML
5 INJECTION, SOLUTION SUBCUTANEOUS WEEKLY
Qty: 2 ML | Refills: 0 | OUTPATIENT
Start: 2025-07-07

## 2025-07-07 RX ORDER — TIRZEPATIDE 2.5 MG/.5ML
2.5 INJECTION, SOLUTION SUBCUTANEOUS WEEKLY
Qty: 2 ML | Refills: 0 | Status: SHIPPED | OUTPATIENT
Start: 2025-07-07

## 2025-07-07 RX ORDER — TIRZEPATIDE 7.5 MG/.5ML
7.5 INJECTION, SOLUTION SUBCUTANEOUS WEEKLY
Qty: 2 ML | Refills: 0 | Status: CANCELLED | OUTPATIENT
Start: 2025-07-07

## 2025-07-07 NOTE — PROGRESS NOTES
Clinical Pharmacy Appointment    Patient ID: [id number] Camille Mchugh is a 55 y.o. female who presents for Follow Up appointment for weight loss/ pre-diabetes. PCP and Referring Provider: Keyanna Levy MD   Last visit with PCP: 3/25/25   Next visit with PCP: 9/25/25    Subjective     PMH:   Past Medical History:   Diagnosis Date    Allergic     Eczema     Hypertension     Personal history of other diseases of the circulatory system     History of hypertension    Sinusitis       Family Hx:  family history includes Diabetes in her father, father, mother, and mother; Eczema in an other family member; Hearing loss in her mother and mother; Heart disease in her mother and mother; Heart disease (age of onset: 70 - 79) in her father; Hypertension in her father, father, mother, and mother; Kidney disease in her mother and mother; environmental allergies in an other family member.     Social Hx:   reports that she has never smoked. She has never used smokeless tobacco. She reports that she does not drink alcohol and does not use drugs.     HISTORY OF PRESENT ILLNESS    WEIGHT LOSS/DIABETES MELLITUS 2 - NEW DIAGNOSIS    BMI Readings from Last 3 Encounters:   04/16/25 34.75 kg/m²   03/25/25 34.93 kg/m²   11/20/24 33.91 kg/m²     CLASS II OBESITY  Starting weight: 191 lbs. (86.6 kg)  Current weight:  In-Office  3/25/25 - 191 lbs  5/13/25 - 187 lbs  6/9/25 - 183 lbs  7/7/25 - 176 lbs    Potential Contributing Factors  Alcohol use: Average 0 drinks/week  Tobacco use: No  Other drug use: No  Medications that may cause weight gain: none  Mental health: No current concerns (seasonal depression)  Eating Disorders: Denies Hx of any eating disorder  Comorbidities: Comorbidities: depressed mood, high cholesterol, and hypertension controlled with oral meds  Constipation, excess gas    Lifestyle  Goal  No more potato chips  Decrease sweets  Eats minimal  Lost 5-6 lbs last year and plateau'ed  Has always struggled with weight loss  Diet:  3 meals/day  Does not over eat, or eat large portions  Eats small meals at work  BK: eggs, sausage   LN: lunch-able  DN: small plates of home cooked meals  Snacks: chips occasionally, nuts, mini candy car, apple, grapes  Eliminated most junk food  Drinks: lots of water  Eats out x1 a week  Carry out: long horn  Has worked with nutritionist/dietician? 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     Non-Pharmacological Therapy  Weight loss techniques attempted:  Self-directed dieting: myfitnesspal  Food restriction  Patient has not had any luck with self directed dieting  Pharmacological Therapy  Current Medications: Mounjaro 5 mg takes dose on Thursdays x 7 doses  Adverse Effects:   SEVERE nausea, uncontrollable and severe flatulence with sulfur odor   Last appt after 3 doses of 5 mg --> moderate nausea and bloating the day after and resolves after 2 days  Severe - diarrhea   Previous Medications: none     Pertinent PMH Review:  PMH of Pancreatitis: No  PMH of Retinopathy: No  PMH of MTC: No  PMH of MEN2: No    Insurance coverage of weight-loss medications? No, exclusion   Eligible for copay cards/programs? Yes, PA approved  Eligible for  PAP? Yes, reports income <400% FPL    Drug Problems  No relevant drug interactions were noted.    Medication System Management  Patient's preferred pharmacy:     [facility] [address] [place] [address] [address]  [facility] [address]  [facility] [address]  Phone: [phone] Fax: [phone]    [facility]  [address]  [facility] [address]  Phone: [phone] Fax: [phone]     Adherence/Medication Access:   Denies missed doses  Does not have coverage for weight loss    Allergies   Reviewed? Yes  Changes? No    Current Outpatient Medications on File Prior to Visit   Medication Sig Dispense Refill    atorvastatin (Lipitor) 40 mg tablet Take 1 tablet (40 mg) by mouth  once daily. 90 tablet 3    azelastine (Astelin) 137 mcg (0.1 %) nasal spray Administer 1 spray into each nostril 2 times a day. Use in each nostril as directed      cetirizine (ZyrTEC) 5 mg tablet Take 2 tablets (10 mg) by mouth once daily as needed for allergies.      docusate sodium (COLACE ORAL) Take by mouth if needed.      enalapril (Vasotec) 10 mg tablet       polyethylene glycol (Glycolax, Miralax) 17 gram packet Take 17 g by mouth if needed.      pseudoephedrine (Sudafed) 30 mg tablet Take 1 tablet (30 mg) by mouth every 6 hours if needed for congestion.      tirzepatide (Mounjaro) 5 mg/0.5 mL pen injector Inject 5 mg under the skin 1 (one) time per week. 2 mL 0     No current facility-administered medications on file prior to visit.     Lab Results   Component Value Date    BILITOT 0.4 03/31/2025    CALCIUM 9.1 03/31/2025    CO2 30 03/31/2025     03/31/2025    CREATININE 0.65 03/31/2025    GLUCOSE 144 (H) 03/31/2025    ALKPHOS 98 03/31/2025    K 4.3 03/31/2025    PROT 6.7 03/31/2025     03/31/2025    AST 22 03/31/2025    ALT 25 03/31/2025    BUN 14 03/31/2025    ANIONGAP 7 03/31/2025    MG 1.9 03/31/2025    PHOS 4.6 (H) 03/31/2025    ALBUMIN 4.1 03/31/2025    EGFR 104 03/31/2025     Lab Results   Component Value Date    TRIG 377 (H) 03/31/2025    CHOL 169 03/31/2025    LDLCALC 86 03/31/2025    HDL 33 (L) 03/31/2025     Lab Results   Component Value Date    HGBA1C 6.5 (H) 03/31/2025     ASSESSMENT AND PLAN     Patient Goals   Stage 1 or stage 2 lose at least 5% of their body weight  AACE/ACE guidelines recommend a loss of at least 10% of body weight for many complication-specific targets  Because early weight loss helps predict sustained weight reduction, the AACE/ACE recommend targeting 2.5% weight loss within 1 month for all patients with overweight or obesity.   All patients with overweight or obesity achieve the realistic and meaningful goal of 5% to 10% weight loss within 6  "months.  Diabetes  Fasting B - 130 mg/dL  Postprandial BG: less than 180 mg/dL  A1c: less than 6.5% - not at goal  Weight Loss  BMI <25 - not at goal  2.5% weight loss in one month   At least 10% weight loss in 6 months  Cholesterol  LDL-C <70 - not at goal  TG <150 - not at goal  Blood Pressure   /80 - at goal    Assessment and Plan  Current regimen includes diet, exercise, and mounjaro. Patient's current weight reported as 176 lbs. Has lost 15 lbs (8% of TBW) and is on week 10 of therapy plan.   Patient described a generally healthy diet, with exception to cholesterol/TG. Patient to improve diet and continue to increase her exercise including 3-5 days of moderate intensity cardio and slowly increasing her daily step goal. Patient agreeable to this plan. CONTINUE mounjaro for weight loss, T2DM, and CVD/renal protection - due to worsening GI symptoms plan to de-escalate to 2.5 mg . Will repeat labs in 3 weeks. TG and LDL-c not at goal, continue the increased dose of statin.   Weight Loss Plan: Increase Mounjaro 5 mg subcutaneously once weekly  The goals of therapy are not \"being met\" with the current medication regimen.  CONTINUE:   Atorvastatin 40 mg once daily  DECREASE  Mounjaro 2.5 mg under the skin once weekly     Future Considerations:  Immunizations: ensure up to date   Mounjaro  Increase statin    Monitoring:  Patient states her PCP said that a repeat hba1c may be more accurate than the POCT performed at their last clinic visit     Education:   Diet   Exercise  Medication  Counseled patient on MOA, expectations, side effects, duration of therapy, contraindications, administration, and monitoring parameters  Answered all patient questions and concerns; provided [facility] phone number if issues/questions arise    Patient Instructions:  Get hba1c lab - checked all ordered labs should be $0 copay for patient --> patient to confirm prior to lab draw  Increase dietary fiber    Clinical Pharmacist follow-up: 3 " weeks Telehealth visit  - check in after 2.5 dose of mounjaro -sick??  - labs --> increase statin  - evaluate diet/excercise    Continue all meds under the continuation of care with the referring provider and clinical pharmacy team.    Malou Elaine, PharmD   Clinical Pharmacist Specialist, Primary Care   Phone: [phone]   Fax: [phone]   Email: [email or web address]    Verbal consent to manage patient's drug therapy was obtained from the patient. They were informed they may decline to participate or withdraw from participation in pharmacy services at any time

## 2025-07-09 ENCOUNTER — PHARMACY VISIT (OUTPATIENT)
Dept: PHARMACY | Facility: CLINIC | Age: 55
End: 2025-07-09
Payer: MEDICARE

## 2025-07-14 ENCOUNTER — APPOINTMENT (OUTPATIENT)
Dept: PHARMACY | Facility: HOSPITAL | Age: 55
End: 2025-07-14
Payer: COMMERCIAL

## 2025-07-14 DIAGNOSIS — I10 ESSENTIAL (PRIMARY) HYPERTENSION: ICD-10-CM

## 2025-07-14 DIAGNOSIS — E66.9 OBESITY (BMI 30-39.9): ICD-10-CM

## 2025-07-14 DIAGNOSIS — E11.9 TYPE 2 DIABETES MELLITUS WITHOUT COMPLICATION, WITH NO HISTORY OF INSULIN USE: ICD-10-CM

## 2025-07-14 DIAGNOSIS — E78.5 HYPERLIPIDEMIA LDL GOAL <70: ICD-10-CM

## 2025-07-14 DIAGNOSIS — E78.1 HYPERTRIGLYCERIDEMIA: ICD-10-CM

## 2025-07-14 PROCEDURE — RXMED WILLOW AMBULATORY MEDICATION CHARGE

## 2025-07-14 RX ORDER — TIRZEPATIDE 5 MG/.5ML
5 INJECTION, SOLUTION SUBCUTANEOUS WEEKLY
Qty: 2 ML | Refills: 1 | Status: SHIPPED | OUTPATIENT
Start: 2025-07-14

## 2025-07-14 NOTE — PROGRESS NOTES
Clinical Pharmacy Appointment    Patient ID: [id number] Camille Mchugh is a 55 y.o. female who presents for Follow Up appointment for weight loss/ pre-diabetes. PCP and Referring Provider: Keyanna Levy MD   Last visit with PCP: 3/25/25   Next visit with PCP: 9/25/25    Subjective     PMH:   Past Medical History:   Diagnosis Date    Allergic     Eczema     Hypertension     Personal history of other diseases of the circulatory system     History of hypertension    Sinusitis       Family Hx:  family history includes Diabetes in her father, father, mother, and mother; Eczema in an other family member; Hearing loss in her mother and mother; Heart disease in her mother and mother; Heart disease (age of onset: 70 - 79) in her father; Hypertension in her father, father, mother, and mother; Kidney disease in her mother and mother; environmental allergies in an other family member.     Social Hx:   reports that she has never smoked. She has never used smokeless tobacco. She reports that she does not drink alcohol and does not use drugs.     HISTORY OF PRESENT ILLNESS    WEIGHT LOSS/DIABETES MELLITUS 2 - NEW DIAGNOSIS    BMI Readings from Last 3 Encounters:   04/16/25 34.75 kg/m²   03/25/25 34.93 kg/m²   11/20/24 33.91 kg/m²     CLASS II OBESITY  Starting weight: 191 lbs. (86.6 kg)  Current weight:  In-Office  3/25/25 - 191 lbs  5/13/25 - 187 lbs  6/9/25 - 183 lbs  7/7/25 - 176 lbs  7/14/25 - 182 lbs    Potential Contributing Factors  Alcohol use: Average 0 drinks/week  Tobacco use: No  Other drug use: No  Medications that may cause weight gain: none  Mental health: No current concerns (seasonal depression)  Eating Disorders: Denies Hx of any eating disorder  Comorbidities: Comorbidities: depressed mood, high cholesterol, and hypertension controlled with oral meds  Constipation, excess gas    Lifestyle  Goal  No more potato chips  Decrease sweets  Eats minimal  Lost 5-6 lbs last year and plateau'ed  Has always struggled with  weight loss  Diet: 3 meals/day  Does not over eat, or eat large portions  Eats small meals at work  BK: eggs, sausage   LN: lunch-able  DN: small plates of home cooked meals  Snacks: chips occasionally, nuts, mini candy car, apple, grapes  Eliminated most junk food  Drinks: lots of water  Eats out x1 a week  Carry out: long horn  Has worked with nutritionist/dietician? 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     Non-Pharmacological Therapy  Weight loss techniques attempted:  Self-directed dieting: myfitnesspal  Food restriction  Patient has not had any luck with self directed dieting  Pharmacological Therapy  Current Medications: Mounjaro 2.5  x 1 doses  Adverse Effects:   SEVERE nausea, uncontrollable and severe flatulence with sulfur odor   Believes this started after mounjaro initiation and she has an underlying disorder - will see GI on 7/28/25  Severe - diarrhea   Previous Medications: none     Pertinent PMH Review:  PMH of Pancreatitis: No  PMH of Retinopathy: No  PMH of MTC: No  PMH of MEN2: No    Insurance coverage of weight-loss medications? No, exclusion   Eligible for copay cards/programs? Yes, [place] approved  Eligible for  PAP? Yes, reports income <400% FPL    Drug Problems  No relevant drug interactions were noted.    Medication System Management  Patient's preferred pharmacy:     [facility] [address] [place] [address] [address]  [facility] [address]  [facility] [address]  Phone: [phone] Fax: [phone]    [facility]  [address]  [facility] [address]  Phone: [phone] Fax: [phone]     Adherence/Medication Access:   Denies missed doses  Does not have coverage for weight loss    Allergies   Reviewed? Yes  Changes? No    Current Outpatient Medications on File Prior to Visit   Medication Sig Dispense Refill    atorvastatin (Lipitor) 40 mg tablet Take 1 tablet (40 mg) by mouth once  daily. 90 tablet 3    azelastine (Astelin) 137 mcg (0.1 %) nasal spray Administer 1 spray into each nostril 2 times a day. Use in each nostril as directed      cetirizine (ZyrTEC) 5 mg tablet Take 2 tablets (10 mg) by mouth once daily as needed for allergies.      docusate sodium (COLACE ORAL) Take by mouth if needed.      enalapril (Vasotec) 10 mg tablet       polyethylene glycol (Glycolax, Miralax) 17 gram packet Take 17 g by mouth if needed.      pseudoephedrine (Sudafed) 30 mg tablet Take 1 tablet (30 mg) by mouth every 6 hours if needed for congestion.      tirzepatide (Mounjaro) 2.5 mg/0.5 mL pen injector Inject 2.5 mg under the skin 1 (one) time per week. 2 mL 0     No current facility-administered medications on file prior to visit.     Lab Results   Component Value Date    BILITOT 0.4 03/31/2025    CALCIUM 9.1 03/31/2025    CO2 30 03/31/2025     03/31/2025    CREATININE 0.65 03/31/2025    GLUCOSE 144 (H) 03/31/2025    ALKPHOS 98 03/31/2025    K 4.3 03/31/2025    PROT 6.7 03/31/2025     03/31/2025    AST 22 03/31/2025    ALT 25 03/31/2025    BUN 14 03/31/2025    ANIONGAP 7 03/31/2025    MG 1.9 03/31/2025    PHOS 4.6 (H) 03/31/2025    ALBUMIN 4.1 03/31/2025    EGFR 104 03/31/2025     Lab Results   Component Value Date    TRIG 377 (H) 03/31/2025    CHOL 169 03/31/2025    LDLCALC 86 03/31/2025    HDL 33 (L) 03/31/2025     Lab Results   Component Value Date    HGBA1C 6.5 (H) 03/31/2025     ASSESSMENT AND PLAN     Patient Goals   Stage 1 or stage 2 lose at least 5% of their body weight  AACE/ACE guidelines recommend a loss of at least 10% of body weight for many complication-specific targets  Because early weight loss helps predict sustained weight reduction, the AACE/ACE recommend targeting 2.5% weight loss within 1 month for all patients with overweight or obesity.   All patients with overweight or obesity achieve the realistic and meaningful goal of 5% to 10% weight loss within 6  "months.  Diabetes  Fasting B - 130 mg/dL  Postprandial BG: less than 180 mg/dL  A1c: less than 6.5% - not at goal  Weight Loss  BMI <25 - not at goal  2.5% weight loss in one month   At least 10% weight loss in 6 months  Cholesterol  LDL-C <70 - not at goal  TG <150 - not at goal  Blood Pressure   /80 - at goal    Assessment and Plan  Current regimen includes diet, exercise, and mounjaro. Patient's current weight reported as 181 lbs. Has lost 10 lbs (5% of TBW) and is on week 12 of therapy plan. She has not been feeling well and believes this is due to an underlying GI issue exacerbated by the mounjaro. She states she does feel better and wants to restart the 5 mg after she takes her last 2.5 mg dose. Patient will be seeing her GI specialist on  and hopefully that will shed some light on her symptoms.   Patient described a generally healthy diet, with exception to cholesterol/TG. Patient to improve diet and continue to increase her exercise including 3-5 days of moderate intensity cardio and slowly increasing her daily step goal. Patient agreeable to this plan, but notes she has not been feeling well and is unable to do this at the current time. CONTINUE mounjaro for weight loss, T2DM, and CVD/renal protection - can resume the 5 mg. Will repeat labs in 3 weeks. TG and LDL-c not at goal, continue the increased dose of statin.   Weight Loss Plan: Increase Mounjaro 5 mg subcutaneously once weekly  The goals of therapy are not \"being met\" with the current medication regimen.  CONTINUE:   Atorvastatin 40 mg once daily  INCREASE  Mounjaro 5 mg under the skin once weekly     Future Considerations:  Immunizations: ensure up to date   Mounjaro  Increase statin    Monitoring:  Patient states her PCP said that a repeat hba1c may be more accurate than the POCT performed at their last clinic visit     Education:   Diet   Exercise  Medication  Counseled patient on MOA, expectations, side effects, duration of " therapy, contraindications, administration, and monitoring parameters  Answered all patient questions and concerns; provided [facility] phone number if issues/questions arise    Patient Instructions:  Get hba1c lab - checked all ordered labs should be $0 copay for patient --> patient to confirm prior to lab draw  Increase dietary fiber    Clinical Pharmacist follow-up: 4 weeks Telehealth visit  - labs --> increase statin  - evaluate diet/excercise    Continue all meds under the continuation of care with the referring provider and clinical pharmacy team.    Malou Elaine PharmD   Clinical Pharmacist Specialist, Primary Care   Phone: [phone]   Fax: [phone]   Email: [email or web address]    Verbal consent to manage patient's drug therapy was obtained from the patient. They were informed they may decline to participate or withdraw from participation in pharmacy services at any time

## 2025-07-16 ENCOUNTER — PHARMACY VISIT (OUTPATIENT)
Dept: PHARMACY | Facility: CLINIC | Age: 55
End: 2025-07-16
Payer: MEDICARE

## 2025-07-29 ENCOUNTER — APPOINTMENT (OUTPATIENT)
Dept: SURGERY | Facility: CLINIC | Age: 55
End: 2025-07-29
Payer: COMMERCIAL

## 2025-08-18 ENCOUNTER — APPOINTMENT (OUTPATIENT)
Dept: PHARMACY | Facility: HOSPITAL | Age: 55
End: 2025-08-18
Payer: COMMERCIAL

## 2025-08-18 DIAGNOSIS — E11.9 TYPE 2 DIABETES MELLITUS WITHOUT COMPLICATION, WITH NO HISTORY OF INSULIN USE: Primary | ICD-10-CM

## 2025-08-18 PROCEDURE — RXMED WILLOW AMBULATORY MEDICATION CHARGE

## 2025-08-18 RX ORDER — TIRZEPATIDE 7.5 MG/.5ML
7.5 INJECTION, SOLUTION SUBCUTANEOUS WEEKLY
Qty: 2 ML | Refills: 1 | Status: SHIPPED | OUTPATIENT
Start: 2025-08-18

## 2025-08-19 ENCOUNTER — PHARMACY VISIT (OUTPATIENT)
Dept: PHARMACY | Facility: CLINIC | Age: 55
End: 2025-08-19
Payer: MEDICARE

## 2025-09-02 ENCOUNTER — APPOINTMENT (OUTPATIENT)
Dept: PHARMACY | Facility: HOSPITAL | Age: 55
End: 2025-09-02
Payer: COMMERCIAL

## 2025-09-02 PROCEDURE — RXMED WILLOW AMBULATORY MEDICATION CHARGE

## 2025-09-04 ENCOUNTER — PHARMACY VISIT (OUTPATIENT)
Dept: PHARMACY | Facility: CLINIC | Age: 55
End: 2025-09-04
Payer: MEDICARE

## 2025-09-25 ENCOUNTER — APPOINTMENT (OUTPATIENT)
Dept: PRIMARY CARE | Facility: CLINIC | Age: 55
End: 2025-09-25
Payer: COMMERCIAL

## 2025-10-07 ENCOUNTER — APPOINTMENT (OUTPATIENT)
Dept: PHARMACY | Facility: HOSPITAL | Age: 55
End: 2025-10-07
Payer: COMMERCIAL